# Patient Record
Sex: MALE | Race: WHITE | NOT HISPANIC OR LATINO | Employment: OTHER | URBAN - METROPOLITAN AREA
[De-identification: names, ages, dates, MRNs, and addresses within clinical notes are randomized per-mention and may not be internally consistent; named-entity substitution may affect disease eponyms.]

---

## 2017-02-24 DIAGNOSIS — E11.9 TYPE 2 DIABETES MELLITUS WITHOUT COMPLICATION: ICD-10-CM

## 2017-05-03 ENCOUNTER — OFFICE VISIT (OUTPATIENT)
Dept: PODIATRY | Facility: CLINIC | Age: 57
End: 2017-05-03
Payer: COMMERCIAL

## 2017-05-03 VITALS — HEIGHT: 71 IN | BODY MASS INDEX: 26.08 KG/M2 | WEIGHT: 186.31 LBS

## 2017-05-03 DIAGNOSIS — E11.42 DIABETIC POLYNEUROPATHY ASSOCIATED WITH TYPE 2 DIABETES MELLITUS: Primary | ICD-10-CM

## 2017-05-03 DIAGNOSIS — L84 CORN OR CALLUS: ICD-10-CM

## 2017-05-03 DIAGNOSIS — M21.6X9 ACQUIRED EQUINUS DEFORMITY OF FOOT, UNSPECIFIED LATERALITY: ICD-10-CM

## 2017-05-03 PROCEDURE — 1160F RVW MEDS BY RX/DR IN RCRD: CPT | Mod: S$GLB,,, | Performed by: PODIATRIST

## 2017-05-03 PROCEDURE — 3044F HG A1C LEVEL LT 7.0%: CPT | Mod: S$GLB,,, | Performed by: PODIATRIST

## 2017-05-03 PROCEDURE — 99213 OFFICE O/P EST LOW 20 MIN: CPT | Mod: S$GLB,,, | Performed by: PODIATRIST

## 2017-05-03 PROCEDURE — 17999 UNLISTD PX SKN MUC MEMB SUBQ: CPT | Mod: CSM,,, | Performed by: PODIATRIST

## 2017-05-03 PROCEDURE — 99999 PR PBB SHADOW E&M-EST. PATIENT-LVL II: CPT | Mod: PBBFAC,,, | Performed by: PODIATRIST

## 2017-05-03 RX ORDER — UREA 40 %
CREAM (GRAM) TOPICAL DAILY
Qty: 185 G | Refills: 11 | Status: SHIPPED | OUTPATIENT
Start: 2017-05-03 | End: 2017-10-12

## 2017-05-03 NOTE — MR AVS SNAPSHOT
[facility]  [address] [place] [address]  Phone: [phone]                  Austin Smith   5/3/2017 3:30 PM   Office Visit    Description:  Male : 1960   Provider:  Eddie Rodney DPM   Department:  [facility]           Reason for Visit     Foot Pain           Diagnoses this Visit        Comments    Diabetic polyneuropathy associated with type 2 diabetes mellitus    -  Primary     Corn or callus         Acquired equinus deformity of foot, unspecified laterality                To Do List           Goals (5 Years of Data)     None      Follow-Up and Disposition     Return in about 1 year (around 5/3/2018) for AR exam.       These Medications        Disp Refills Start End    urea (CARMOL) 40 % Crea 185 g 11 5/3/2017     Apply topically once daily. - Topical    Pharmacy: [facility] - [place] - [address] [facility] Ph #: [phone]         [facility] On [facility]     [facility] On Call Nurse Care Line -  Assistance  Unless otherwise directed by your provider, please contact [facility], our nurse care line that is available for  assistance.     Registered nurses in the [facility] provide: appointment scheduling, clinical advisement, health education, and other advisory services.  Call: [phone] (toll free)               Medications           Message regarding Medications     Verify the changes and/or additions to your medication regime listed below are the same as discussed with your clinician today.  If any of these changes or additions are incorrect, please notify your healthcare provider.        START taking these NEW medications        Refills    urea (CARMOL) 40 % Crea 11    Sig: Apply topically once daily.    Class: Normal    Route: Topical      STOP taking these medications     gabapentin (NEURONTIN) 300 MG capsule Take 1 capsule (300 mg total) by mouth 3 (three) times daily.    hydrocodone-acetaminophen 5-325mg (NORCO) 5-325 mg  "per tablet Take 1 tablet by mouth every 6 (six) hours as needed for Pain.           Verify that the below list of medications is an accurate representation of the medications you are currently taking.  If none reported, the list may be blank. If incorrect, please contact your healthcare provider. Carry this list with you in case of emergency.           Current Medications     blood sugar diagnostic (ONE TOUCH ULTRA TEST) Strp Test twice a day    aspirin 81 mg Tab once daily. Every day    ONE TOUCH ULTRASOFT LANCETS MISC Test twice a day    urea (CARMOL) 40 % Crea Apply topically once daily.           Clinical Reference Information           Your Vitals Were     Height Weight BMI          5' 11" (1.803 m) 84.5 kg (186 lb 4.6 oz) 25.98 kg/m2        Allergies as of 5/3/2017     No Known Allergies      Immunizations Administered on Date of Encounter - 5/3/2017     None      Orders Placed During Today's Visit      Normal Orders This Visit    DIABETIC SHOES FOR HOME USE       Smoking Cessation     If you would like to quit smoking:   You may be eligible for free services if you are a [place] resident and started smoking cigarettes before September 1, 1988.  Call the Smoking Cessation Trust ([facility]) toll free at [phone] [phone] or [phone].   Call 1-800-QUIT-NOW if you do not meet the above criteria.   Contact us via email: [email or web address]   View our website for more information: [email or web address]        Language Assistance Services     ATTENTION: Language assistance services are available, free of charge. Please call [phone].      ATENCIÓN: Si habla español, tiene a loaiza disposición servicios gratuitos de asistencia lingüística. Llame al [phone].     CHÚ Ý: N?u b?n nói Ti?ng Vi?t, có các d?ch v? h? tr? ngôn ng? mi?n phí dành cho b?n. G?i s? [phone].         [facility] complies with applicable Federal civil rights laws and does not discriminate on the basis of race, " color, national origin, age, disability, or sex.

## 2017-05-03 NOTE — PROGRESS NOTES
Subjective:      Patient ID: Austin Smith is a 57 y.o. male.    Chief Complaint: Foot Pain (bilateral)    Austin is a 57 y.o. male who presents to the clinic upon referral from Dr. Clara martinez. provider found  for evaluation and treatment of diabetic feet. Austin has a past medical history of Anticoagulant long-term use; Arthritis; Cataract; DM type 2 (diabetes mellitus, type 2); GERD (gastroesophageal reflux disease); Hyperlipidemia; Hypertension; Iris nevus; Lattice degeneration; Migraine syndrome; Neck pain; and Wears glasses. Patient relates no major problem with feet. Only complaints today consist of thick painful callus bilateral feet.  Gradual onset, worsening over past several weeks, aggravated by increased weight bearing, shoe gear, pressure.  Periodic debridement helps symptoms.      PCP: Alessandro Deleon MD    Date Last Seen by PCP:   Chief Complaint   Patient presents with    Foot Pain     bilateral        Current shoe gear: Casual shoes    Hemoglobin A1C   Date Value Ref Range Status   07/14/2016 6.5 (H) 4.5 - 6.2 % Final     Comment:     According to ADA guidelines, hemoglobin A1C <7.0% represents  optimal control in non-pregnant diabetic patients.  Different  metrics may apply to specific populations.   Standards of Medical Care in Diabetes - 2016.  For the purpose of screening for the presence of diabetes:  <5.7%     Consistent with the absence of diabetes  5.7-6.4%  Consistent with increasing risk for diabetes   (prediabetes)  >or=6.5%  Consistent with diabetes  Currently no consensus exists for use of hemoglobin A1C  for diagnosis of diabetes for children.     07/15/2014 6.6 (H) 4.5 - 6.2 % Final   09/10/2012 7.4 (H) 4.0 - 6.2 % Final           Review of Systems   Constitution: Negative for chills, diaphoresis, fever, malaise/fatigue and night sweats.   Cardiovascular: Negative for claudication, cyanosis, leg swelling and syncope.   Skin: Positive for suspicious lesions. Negative for color  change, dry skin, nail changes, rash and unusual hair distribution.   Musculoskeletal: Negative for falls, joint pain, joint swelling, muscle cramps, muscle weakness and stiffness.   Gastrointestinal: Negative for constipation, diarrhea, nausea and vomiting.   Neurological: Positive for numbness and sensory change. Negative for brief paralysis, disturbances in coordination, focal weakness, paresthesias and tremors.           Objective:      Physical Exam   Constitutional: He appears well-developed and well-nourished. He is cooperative. No distress.   Cardiovascular:   Pulses:       Popliteal pulses are 2+ on the right side, and 2+ on the left side.        Dorsalis pedis pulses are 2+ on the right side, and 2+ on the left side.        Posterior tibial pulses are 2+ on the right side, and 2+ on the left side.   Capillary refill 3 seconds all toes/distal feet, all toes/both feet warm to touch.      Negative lymphadenopathy bilateral popliteal fossa and tarsal tunnel.      Negavie lower extremity edema bilateral.     Musculoskeletal:        Right ankle: Normal. He exhibits normal range of motion, no swelling, no ecchymosis, no deformity, no laceration and normal pulse. Achilles tendon normal. Achilles tendon exhibits no pain, no defect and normal Fletcher's test results.   Ankle dorsiflexion decreased at <10 degrees bilateral with moderate increase with knee flexion bilateral.    Otherwise, Normal angle, base, station of gait. All ten toes without clubbing, cyanosis, or signs of ischemia.  No pain to palpation bilateral lower extremities.  Range of motion, stability, muscle strength, and muscle tone normal bilateral feet and legs.     Lymphadenopathy: No inguinal adenopathy noted on the right or left side.   Negative lymphadenopathy bilateral popliteal fossa and tarsal tunnel.   Neurological: He is alert. He has normal strength. He displays no atrophy and no tremor. A sensory deficit is present. He exhibits normal  muscle tone. He displays no seizure activity. Gait normal.   Reflex Scores:       Patellar reflexes are 2+ on the right side and 2+ on the left side.       Achilles reflexes are 2+ on the right side and 2+ on the left side.  Diminished/loss of protective sensation all toes bilateral to 10 gram monofilament.     Skin: Skin is warm, dry and intact. No abrasion, no bruising, no burn, no ecchymosis, no laceration, no lesion and no rash noted. He is not diaphoretic. No cyanosis or erythema. No pallor. Nails show no clubbing.     Focal hyperkeratotic lesion consisting entirely of hyperkeratotic tissue without open skin, drainage, pus, fluctuance, malodor, or signs of infection beneath mtpj 5 left, medial heel left, mtpj 2 right, distal 4th toe left.    Otherwise, Skin is normal age and health appropriate color, turgor, texture, and temperature bilateral lower extremities without ulceration, hyperpigmentation, discoloration, masses nodules or cords palpated.  No ecchymosis, erythema, edema, or cardinal signs of infection bilateral lower extremities.    Toenails normal color and trophic qualities.              Assessment:       Encounter Diagnoses   Name Primary?    Diabetic polyneuropathy associated with type 2 diabetes mellitus Yes    Corn or callus     Acquired equinus deformity of foot, unspecified laterality          Plan:       Austin was seen today for foot pain.    Diagnoses and all orders for this visit:    Diabetic polyneuropathy associated with type 2 diabetes mellitus  -     DIABETIC SHOES FOR HOME USE    Corn or callus  -     DIABETIC SHOES FOR HOME USE    Acquired equinus deformity of foot, unspecified laterality  -     DIABETIC SHOES FOR HOME USE    Other orders  -     urea (CARMOL) 40 % Crea; Apply topically once daily.      I counseled the patient on his conditions, their implications and medical management.        - Shoe inspection. Diabetic Foot Education. Patient reminded of the importance of good  nutrition and blood sugar control to help prevent podiatric complications of diabetes. Patient instructed on proper foot hygeine. We discussed wearing proper shoe gear, daily foot inspections, never walking without protective shoe gear, never putting sharp instruments to feet, routine podiatric visits at least annually.      Rx urea, DM shoes, custom inserts.    Non covered foot care:    With the patient's permission, I debrided hyperkeratotic lesion(s) as above totaling         4       to, not  Including dermis with sterile #15 blade.  Patient tolerated the procedure well and related significant relief.              No Follow-up on file.

## 2017-06-13 ENCOUNTER — TELEPHONE (OUTPATIENT)
Dept: PODIATRY | Facility: CLINIC | Age: 57
End: 2017-06-13

## 2017-06-13 NOTE — TELEPHONE ENCOUNTER
Clinical note and diabetic shoe prescription faxed to [facility] via [facility].

## 2017-07-12 ENCOUNTER — OFFICE VISIT (OUTPATIENT)
Dept: PODIATRY | Facility: CLINIC | Age: 57
End: 2017-07-12
Payer: COMMERCIAL

## 2017-07-12 VITALS — WEIGHT: 186.31 LBS | HEIGHT: 71 IN | BODY MASS INDEX: 26.08 KG/M2

## 2017-07-12 DIAGNOSIS — M21.6X9 EQUINUS DEFORMITY OF FOOT: ICD-10-CM

## 2017-07-12 DIAGNOSIS — M21.6X9 ACQUIRED EQUINUS DEFORMITY OF FOOT, UNSPECIFIED LATERALITY: ICD-10-CM

## 2017-07-12 DIAGNOSIS — E11.42 DIABETIC POLYNEUROPATHY ASSOCIATED WITH TYPE 2 DIABETES MELLITUS: Primary | ICD-10-CM

## 2017-07-12 DIAGNOSIS — M20.42 HAMMER TOES OF BOTH FEET: ICD-10-CM

## 2017-07-12 DIAGNOSIS — M20.41 HAMMER TOES OF BOTH FEET: ICD-10-CM

## 2017-07-12 DIAGNOSIS — B35.1 ONYCHOMYCOSIS DUE TO DERMATOPHYTE: ICD-10-CM

## 2017-07-12 DIAGNOSIS — L84 CORN OR CALLUS: ICD-10-CM

## 2017-07-12 PROCEDURE — 99999 PR PBB SHADOW E&M-EST. PATIENT-LVL II: CPT | Mod: PBBFAC,,,

## 2017-07-12 PROCEDURE — 3044F HG A1C LEVEL LT 7.0%: CPT | Mod: S$GLB,,,

## 2017-07-12 PROCEDURE — 11721 DEBRIDE NAIL 6 OR MORE: CPT | Mod: 59,Q9,S$GLB,

## 2017-07-12 PROCEDURE — 11056 PARNG/CUTG B9 HYPRKR LES 2-4: CPT | Mod: Q9,S$GLB,,

## 2017-07-12 PROCEDURE — 99214 OFFICE O/P EST MOD 30 MIN: CPT | Mod: 25,S$GLB,,

## 2017-07-12 RX ORDER — GABAPENTIN 300 MG/1
300 CAPSULE ORAL NIGHTLY
Qty: 30 CAPSULE | Refills: 6 | Status: SHIPPED | OUTPATIENT
Start: 2017-07-12 | End: 2017-11-08

## 2017-07-12 NOTE — PROGRESS NOTES
Subjective:       Patient ID: Austin Smith is a 57 y.o. male.    Chief Complaint: Foot Pain (chris foot pain , pt would like second opinion , Saw Dr. Rodney in May of this year)    BEATRIS Avila is a 57 y.o. male who presents to the clinic for evaluation and treatment of high risk feet. Austin has a past medical history of Anticoagulant long-term use; Arthritis; Cataract; DM type 2 (diabetes mellitus, type 2); GERD (gastroesophageal reflux disease); Hyperlipidemia; Hypertension; Iris nevus; Lattice degeneration; Migraine syndrome; Neck pain; and Wears glasses. The patient's chief complaint is long, thick toenails, painful calluses, burning to feet. This patient has documented high risk feet requiring routine maintenance secondary to peripheral neuropathy.    PCP: Alessandro Deleon MD      Current shoe gear:  Affected Foot: Tennis shoes     Unaffected Foot: Tennis shoes    Hemoglobin A1C   Date Value Ref Range Status   07/14/2016 6.5 (H) 4.5 - 6.2 % Final     Comment:     According to ADA guidelines, hemoglobin A1C <7.0% represents  optimal control in non-pregnant diabetic patients.  Different  metrics may apply to specific populations.   Standards of Medical Care in Diabetes - 2016.  For the purpose of screening for the presence of diabetes:  <5.7%     Consistent with the absence of diabetes  5.7-6.4%  Consistent with increasing risk for diabetes   (prediabetes)  >or=6.5%  Consistent with diabetes  Currently no consensus exists for use of hemoglobin A1C  for diagnosis of diabetes for children.     07/15/2014 6.6 (H) 4.5 - 6.2 % Final   09/10/2012 7.4 (H) 4.0 - 6.2 % Final     Review of Systems  ROS:  Constitution: Negative for chills, fever, weakness and malaise/fatigue.   HEENT: Negative for headaches.   Cardiovascular: Negative for chest pain and claudication.   Respiratory: Negative for cough and shortness of breath.   Musculoskeletal: Positive for foot pain.  Negative for muscle cramps and muscle weakness.    Gastrointestinal: Negative for nausea and vomiting.   Neurological: Positive for numbness and paresthesias.   Dermatological: Negative for wound.        Objective:      Physical Exam  Constitutional:   Patient is oriented to person, place, and time. Vital signs are normal. Appears well-developed and well-nourished.     Vascular:   Dorsalis pedis pulses are 2+ on the right side, and 2+ on the left side.   Posterior tibial pulses are 2+ on the right side, and 2+ on the left side.   - digital hair growth, capillary fill time to all toes <3 seconds, toes are cool to touch  - swelling feet and ankles    Skin/Dermatological:   Skin is thin, warm, shiny and atrophic. No cyanosis or clubbing. No rashes noted. No open wounds.   All ten toenails yellow discolored, thickened 3 mm, elongated 3 mm with subungual debris and tenderness.  Porokeratosis sub 2nd MTH, left sub 5th MTh    Musculoskeletal:   Pes cavus, mild hammertoes and bunionettes observed.  Decreased range of motion of bilateral midtarsal, subtalar joints, ankle joint dorsiflexion is restricted bilaterally. Muscle strength to tibialis anterior, extensor hallucis longus, extensor digitorum longus, peroneal muscles, flexor hallucis/digotorum longus, posterior tibial and gastrosoleal complex is 5/5.    Neurological:   Positive deficits to sharp/dull, light touch or vibratory sensation bilateral feet             Assessment:       1. Diabetic polyneuropathy associated with type 2 diabetes mellitus    2. Corn or callus    3. Acquired equinus deformity of foot, unspecified laterality    4. Hammer toes of both feet    5. Onychomycosis due to dermatophyte        Plan:       Diabetic polyneuropathy associated with type 2 diabetes mellitus  -     gabapentin (NEURONTIN) 300 MG capsule; Take 1 capsule (300 mg total) by mouth every evening.  Dispense: 30 capsule; Refill: 6  -     DIABETIC SHOES FOR HOME USE    Corn or callus  -     DIABETIC SHOES FOR HOME USE    Acquired  equinus deformity of foot, unspecified laterality  -     DIABETIC SHOES FOR HOME USE    Hammer toes of both feet  -     DIABETIC SHOES FOR HOME USE    Onychomycosis due to dermatophyte  -     DIABETIC SHOES FOR HOME USE          Shoe inspection. Diabetic Foot Education. Patient reminded of the importance of good nutrition and blood sugar control to help prevent podiatric complications of diabetes. Patient instructed on proper foot hygeine. We discussed wearing proper shoe gear, daily foot inspections, never walking without protective shoe gear, never putting sharp instruments to feet.  We also discussed padding and shoes with high toe boxes for foot deformities.     - With patient's permission, all ten toenails were aggressively reduced and debrided  to their soft tissue attachment mechanically with nail nipper, removing all offending nail and debris. The porokeratotic tissue was pared x 3 with a 15 blade.  Patient relates relief following the procedure. Patient will continue to monitor the areas daily, inspect feet, wear protective shoe gear when ambulatory, moisturizer to maintain skin integrity and follow in this office in approximately 6 months, sooner p.r.n.

## 2017-07-21 DIAGNOSIS — E11.9 TYPE 2 DIABETES MELLITUS WITHOUT COMPLICATION: ICD-10-CM

## 2017-08-21 DIAGNOSIS — Z12.11 COLON CANCER SCREENING: ICD-10-CM

## 2017-10-12 ENCOUNTER — OFFICE VISIT (OUTPATIENT)
Dept: PODIATRY | Facility: CLINIC | Age: 57
End: 2017-10-12
Payer: COMMERCIAL

## 2017-10-12 VITALS — BODY MASS INDEX: 26.51 KG/M2 | WEIGHT: 189.38 LBS | HEIGHT: 71 IN

## 2017-10-12 DIAGNOSIS — E11.9 CONTROLLED TYPE 2 DIABETES MELLITUS WITHOUT COMPLICATION, WITHOUT LONG-TERM CURRENT USE OF INSULIN: Primary | ICD-10-CM

## 2017-10-12 DIAGNOSIS — L84 CORN OR CALLUS: ICD-10-CM

## 2017-10-12 DIAGNOSIS — L84 PRE-ULCERATIVE CALLUSES: ICD-10-CM

## 2017-10-12 PROCEDURE — 99999 PR PBB SHADOW E&M-EST. PATIENT-LVL III: CPT | Mod: PBBFAC,,, | Performed by: PODIATRIST

## 2017-10-12 PROCEDURE — 99213 OFFICE O/P EST LOW 20 MIN: CPT | Mod: S$GLB,,, | Performed by: PODIATRIST

## 2017-10-12 RX ORDER — UREA 40 %
CREAM (GRAM) TOPICAL 2 TIMES DAILY
Qty: 1 BOTTLE | Refills: 3 | Status: SHIPPED | OUTPATIENT
Start: 2017-10-12 | End: 2018-03-06

## 2017-10-12 NOTE — PROGRESS NOTES
Subjective:      Patient ID: Austin Smith is a 57 y.o. male.    Chief Complaint: Diabetic Foot Exam (PCP:  Dr Deleon  8/31/16  HgbA1c: 7/14/16 6.5); Callouses; and Nail Care    Austin is a 57 y.o. male who presents to the clinic upon referral from Dr. Mackey  for evaluation and treatment of diabetic feet. Austin has a past medical history of Anticoagulant long-term use; Arthritis; Cataract; DM type 2 (diabetes mellitus, type 2); GERD (gastroesophageal reflux disease); Hyperlipidemia; Hypertension; Iris nevus; Lattice degeneration; Migraine syndrome; Neck pain; and Wears glasses. Patient relates no major problem with feet. Only complaints today consist of pain secondary to calluses bilateral, pain is mostly present with weight bearing.     Current shoe gear: Tennis shoes    Hemoglobin A1C   Date Value Ref Range Status   07/14/2016 6.5 (H) 4.5 - 6.2 % Final     Comment:     According to ADA guidelines, hemoglobin A1C <7.0% represents  optimal control in non-pregnant diabetic patients.  Different  metrics may apply to specific populations.   Standards of Medical Care in Diabetes - 2016.  For the purpose of screening for the presence of diabetes:  <5.7%     Consistent with the absence of diabetes  5.7-6.4%  Consistent with increasing risk for diabetes   (prediabetes)  >or=6.5%  Consistent with diabetes  Currently no consensus exists for use of hemoglobin A1C  for diagnosis of diabetes for children.     07/15/2014 6.6 (H) 4.5 - 6.2 % Final   09/10/2012 7.4 (H) 4.0 - 6.2 % Final           Review of Systems   Constitution: Negative for chills and fever.   Cardiovascular: Negative for claudication and leg swelling.   Respiratory: Negative for shortness of breath.    Skin: Positive for suspicious lesions. Negative for itching, nail changes and rash.   Musculoskeletal: Negative for muscle cramps, muscle weakness and myalgias.   Gastrointestinal: Negative for nausea and vomiting.   Neurological: Negative for focal  weakness, loss of balance, numbness and paresthesias.           Objective:      Physical Exam   Constitutional: He is oriented to person, place, and time. He appears well-developed and well-nourished. No distress.   Cardiovascular:   Pulses:       Dorsalis pedis pulses are 2+ on the right side, and 2+ on the left side.        Posterior tibial pulses are 2+ on the right side, and 2+ on the left side.   < 3 sec capillary refill time to toes 1-5 bilateral. Toes and feet are warm to touch proximally with normal distal cooling b/l. There is some hair growth on the feet and toes b/l. There is no edema b/l. No spider veins or varicosities present b/l.      Musculoskeletal:   Equinus noted b/l ankles with < 10 deg DF noted. MMT 5/5 in DF/PF/Inv/Ev resistance with no reproduction of pain in any direction. Passive range of motion of ankle and pedal joints is painless b/l.     Feet:   Right Foot:   Protective Sensation: 10 sites tested. 10 sites sensed.   Left Foot:   Protective Sensation: 10 sites tested. 10 sites sensed.   Neurological: He is alert and oriented to person, place, and time. He has normal strength. He displays no atrophy and no tremor. No sensory deficit. He exhibits normal muscle tone.   Reflex Scores:       Achilles reflexes are 2+ on the right side and 2+ on the left side.  Negative tinel sign bilateral.   Skin: Skin is warm, dry and intact. Lesion noted. No abrasion, no bruising, no burn, no ecchymosis, no laceration, no petechiae and no rash noted. He is not diaphoretic. No cyanosis or erythema. No pallor. Nails show no clubbing.   Skin temperature, texture and turgor within normal limits.    Hyperkeratotic lesions noted Left: Plantar fifth metatarsal head and medial heel, Right: Plantar first and second metatarsals. Sub second and first metatarsal right foot there are pre ulcerative lesions noted with discoloration of skin.   Psychiatric: He has a normal mood and affect. His behavior is normal.              Assessment:       Encounter Diagnoses   Name Primary?    Controlled type 2 diabetes mellitus without complication, without long-term current use of insulin Yes    Corn or callus     Pre-ulcerative calluses          Plan:       Austin was seen today for diabetic foot exam, callouses and nail care.    Diagnoses and all orders for this visit:    Controlled type 2 diabetes mellitus without complication, without long-term current use of insulin    Corn or callus    Pre-ulcerative calluses    Other orders  -     urea (CARMOL) 40 % Crea; Apply topically 2 (two) times daily.      I counseled the patient on his conditions, their implications and medical management.    Use the urea cream BID    Patient will obtain over the counter arch supports and wear them in shoes whenever possible.  Athletic shoes intended for walking or running are usually best.    Fabricated, dispensed and fitted with metatarsal pad bilateral shoes.     Patient will stretch the tendo achilles complex three times daily as demonstrated in the office.  Literature was dispensed illustrating proper stretching technique.    Discussed diabetic shoes that were prescribed by Dr. Mackey at last appointment, he does not want the diabetic shoes.    Shoe inspection. Diabetic Foot Education. Patient reminded of the importance of good nutrition and blood sugar control to help prevent podiatric complications of diabetes. Patient instructed on proper foot hygeine. We discussed wearing proper shoe gear, daily foot inspections, never walking without protective shoe gear, never putting sharp instruments to feet    With patient's verbal consent the hyperkeratotic lesions x4 bilateral foot were trimmed to healthy appearing skin using a # 15 scalpel. Patient relates relief of pain following the procedure. Patient tolerated the procedure well without complications. No anesthesia or hemostasis required. No blood loss.    Discussed that he does not qualify for routine care  to be covered by insurance. Next time he comes for callus debridement he will need to be here for Procedure Brief, paperwork given explaining procedure Brief policy.    Return PRN    NINI SunM

## 2017-10-12 NOTE — PATIENT INSTRUCTIONS
1. Stretch calf and plantar fascia 10x per day for 30 sec and before getting out of bed.    2. Supportive shoes at all times (athletic shoe including doll, new balance, asics, HOKA or casual shoes like Dansko, Syeda, Naot, Vionoic, Fit flop  clog or wedge with extra heel padding and arch support.    (Varsity sports, Phidippides, [place] running company, Masseys, Goodfeet, Cantilever, Feet First, Foot Solutions, Therapeutic shoes, SAS, [facility] pro shop) [email or web address]    3. Orthotic (recommend the following brands: Superfeet, Spenco, Powerstep, Sof Sole Fit Series)    What is a Foot Callus?    Calluses are a thickening of the surface layer of the skin. This usually occurs in response to pressure. Calluses often form on the ball of the foot, the heel, and the underside of the big toe.    Calluses are more common in women than men.    How is a Foot Callus formed?    The formation of calluses is caused by an accumulation of dead skin cells that harden and thicken over an area of the foot.    Symptoms of a Foot Callus    The most common symptoms are:        A hard growth usually on the ball of the foot      Pain on weight bearing, relieved by rest      Increased discomfort in thin soled and high heeled shoes    What Causes a Foot Callus?    Calluses develop because of excessive pressure at a specific area of the foot.   Calluses and corns on the feet are often caused by pressure from footwear. Walking barefoot also causes calluses.    The most common causes of calluses are:        High heeled shoes      Mal-allignment of the metatarsal bones      Shoes that are too small      Abnormalities of gait      Flat feet      High arched feet      Excessively long metatarsal bone      Obesity      Bony prominence      Loss of fat pad on the underside of the foot      Short Achilles tendon       How are they treated?    Calluses and corns do not need treatment unless they cause pain. If they do  cause pain, you can ease the pain by:        Wearing shoes that fit well and are roomy, with wide and deep toe boxes (the area that surrounds the toes).          A wider toe box keeps the toes from pressing against each other, relieving pressure on soft corns.          A deeper toe box keeps the toes from pressing against the top of the shoe, relieving pressure on hard corns.        Using protective padding while your foot heals, such as:          Moleskin.          Toe separators          Toe crest pads          Toe caps and toe sleeves    Other things you can try include:        Reducing the size of the callus or corn by soaking your callus or corn in warm water and then using a pumice stone to lightly wear away the dead skin.         Using salicylic acid to soften the callus or corn. You can then rub the callus or corn off with a pumice stone. Some doctors advise against using salicylic acid because it can damage surrounding skin. If you use salicylic acid, be sure to apply it only to the callus or corn and not to the surrounding skin. And never use salicylic acid if you have diabetes or other conditions that cause circulatory problems or numbness.      Prevention    Corns and calluses are easier to prevent than to treat. When it is not desirable to form a callus, minimizing rubbing and pressure will prevent callus formation. Footwear should be properly fitted  and protective pads, rings or skin dressings may be used. People with poor circulation or sensation should check their skin often for signs of rubbing and irritation so they can minimize any damage.

## 2017-10-12 NOTE — LETTER
October 12, 2017      Alpesh Mackey DPM  [address] [facility] [address] [place] [address]           [facility]  [address] [facility] [address] [place] [address]  Phone: [phone]          Patient: Austin Smith   MR Number: [id number]   YOB: 1960   Date of Visit: 10/12/2017       Dear Dr. Alpesh Mackey:    Thank you for referring Austin Smith to me for evaluation. Attached you will find relevant portions of my assessment and plan of care.    If you have questions, please do not hesitate to call me. I look forward to following Austin Smith along with you.    Sincerely,    Austen Rios, TAB    Enclosure  CC:  No Recipients    If you would like to receive this communication electronically, please contact [email or web address] or [phone] to request more information on [employer] access.    For providers and/or their staff who would like to refer a patient to [facility], please contact us through our one-stop-shop provider referral line, [facility], at [phone].    If you feel you have received this communication in error or would no longer like to receive these types of communications, please e-mail [email or web address]

## 2017-11-08 ENCOUNTER — TELEPHONE (OUTPATIENT)
Dept: PAIN MEDICINE | Facility: CLINIC | Age: 57
End: 2017-11-08

## 2017-11-08 ENCOUNTER — OFFICE VISIT (OUTPATIENT)
Dept: PAIN MEDICINE | Facility: CLINIC | Age: 57
End: 2017-11-08
Payer: COMMERCIAL

## 2017-11-08 VITALS
SYSTOLIC BLOOD PRESSURE: 150 MMHG | DIASTOLIC BLOOD PRESSURE: 80 MMHG | WEIGHT: 189.38 LBS | BODY MASS INDEX: 26.41 KG/M2 | RESPIRATION RATE: 18 BRPM | HEART RATE: 78 BPM | TEMPERATURE: 99 F

## 2017-11-08 DIAGNOSIS — M50.30 DDD (DEGENERATIVE DISC DISEASE), CERVICAL: ICD-10-CM

## 2017-11-08 DIAGNOSIS — M54.12 CERVICAL RADICULOPATHY: Primary | ICD-10-CM

## 2017-11-08 PROCEDURE — 99999 PR PBB SHADOW E&M-EST. PATIENT-LVL III: CPT | Mod: PBBFAC,,, | Performed by: PHYSICIAN ASSISTANT

## 2017-11-08 PROCEDURE — 99213 OFFICE O/P EST LOW 20 MIN: CPT | Mod: S$GLB,,, | Performed by: PHYSICIAN ASSISTANT

## 2017-11-08 RX ORDER — SODIUM CHLORIDE, SODIUM LACTATE, POTASSIUM CHLORIDE, CALCIUM CHLORIDE 600; 310; 30; 20 MG/100ML; MG/100ML; MG/100ML; MG/100ML
INJECTION, SOLUTION INTRAVENOUS CONTINUOUS
Status: CANCELLED | OUTPATIENT
Start: 2017-12-04

## 2017-11-08 RX ORDER — GABAPENTIN 300 MG/1
300 CAPSULE ORAL 2 TIMES DAILY
Qty: 60 CAPSULE | Refills: 2 | Status: SHIPPED | OUTPATIENT
Start: 2017-11-08 | End: 2018-05-23

## 2017-11-08 NOTE — PROGRESS NOTES
This note was completed with dictation software and grammatical errors may exist.    CC:Neck pain    HPI: The patient is a 57-year-old man with a history of diabetes, Alcohol abuse who presents in referral from Dr. Deleon for neck pain.  He returns in follow-up today with neck pain.  This has been slowly getting worse.  It is located in the bilateral neck, trapezius muscles, upper back and also causing headaches.  He describes it as popping, cracking, aching, worse at night and with lifting anything.  He states he has to constantly change positions to find some sort of relief.  He reports occasional numbness in his trapezius muscles and shoulders.  He denies weakness, bladder or bowel incontinence.    Pain intervention history:  He is status post C7-T1 cervical interlaminar epidural steroid injection on 7/22/14 with 25-30% relief.  He is status post C7-T1 cervical interlaminar epidural steroid injection on 8/27/14 with no additional relief.     ROS:He reports headaches, joint stiffness, joint swelling and difficulty sleeping.  Balance of review of systems is negative.    Patient has a prior history of alcohol abuse, was previously mandated to attend AA, was previously taking Antabuse.  Has a history of 4 DUIs.    Medical, surgical, family and social history reviewed elsewhere in record.    Medications/Allergies: See med card    Vitals:    11/08/17 1024   BP: (!) 150/80   Pulse: 78   Resp: 18   Temp: 98.7 °F (37.1 °C)   TempSrc: Oral   Weight: 85.9 kg (189 lb 6 oz)   PainSc:   6   PainLoc: Neck         Physical exam:  Gen: A and O x3, pleasant, well-groomed  Skin: No rashes or obvious lesions  HEENT: PERRLA  CVS: Regular rate and rhythm, normal S1 and S2, no murmurs.  Resp: Clear to auscultation bilaterally, no wheezes or rales.  Abdomen: Soft, NT/ND, normal bowel sounds present.  Musculoskeletal: Able to heel walk, toe walk. No antalgic gait.     Neuro:  Upper extremities: 5/5 strength bilaterally   Lower  extremities: 5/5 strength bilaterally  Reflexes: Brachioradialis 2+, Bicep 2+, Tricep 2+. Patellar 2+, Achilles 2+.  Sensory: Intact and symmetrical to light touch and pinprick in C2-T1 dermatomes bilaterally. Intact and symmetrical to light touch and pinprick in L2-S1 dermatomes bilaterally.    Cervical Spine:  Cervical spine:Range of motion is moderately decreased with flexion, extension and lateral rotation with increased midline neck pain during each maneuver.  Spurling's maneuver causes neck pain to either side, no radicular pain.  Myofascial exam: mild tenderness to palpation across bilateral cervical paraspinous muscles.      Imagin14 MRI C-spine  1. C3-4 mild bilateral foraminal stenosis.  2. C4-5 moderate bilateral foraminal stenosis, left greater than right.  3. C5-6 disc-osteophyte complex with spinal cord impingement and severe bilateral foraminal stenosis.  4. C6-7 mild disc bulge with moderate left and mild right foraminal stenosis.      Assessment:  The patient is a 57-year-old man with a history of diabetes, Alcohol abuse who presents in referral from Dr. Deleon for neck pain.   1. Cervical radiculopathy     2. DDD (degenerative disc disease), cervical  Ambulatory referral to Neurosurgery    MRI Cervical Spine Without Contrast       Plan:  1.  The patient states that his pain is the worst it has ever been.  I will update his cervical spine MRI and have him see neurosurgery because of history of spinal cord impingement.  In the meantime, I will schedule him for a cervical DEVAN to see if this provides some relief.  I have also provided prescription for gabapentin 300 mg nightly, increasing to twice a day if tolerated.  2.  Follow-up in 4 weeks post procedure or sooner as needed.

## 2017-11-09 ENCOUNTER — HOSPITAL ENCOUNTER (OUTPATIENT)
Dept: RADIOLOGY | Facility: HOSPITAL | Age: 57
Discharge: HOME | End: 2017-11-09
Attending: PHYSICIAN ASSISTANT
Payer: COMMERCIAL

## 2017-11-09 DIAGNOSIS — M50.30 DDD (DEGENERATIVE DISC DISEASE), CERVICAL: ICD-10-CM

## 2017-11-09 PROCEDURE — 72141 MRI NECK SPINE W/O DYE: CPT | Mod: TC,PO

## 2017-11-09 PROCEDURE — 72141 MRI NECK SPINE W/O DYE: CPT | Mod: 26,,, | Performed by: RADIOLOGY

## 2017-11-14 ENCOUNTER — TELEPHONE (OUTPATIENT)
Dept: NEUROSURGERY | Facility: CLINIC | Age: 57
End: 2017-11-14

## 2017-11-14 NOTE — TELEPHONE ENCOUNTER
----- Message from Jennifer Huggins sent at 11/13/2017 11:33 AM CST -----  Contact: patient  Patient had to cancel his 11/13/17 appointment contact him at [phone] to reschedule.     Thank you

## 2017-11-28 DIAGNOSIS — M50.30 DDD (DEGENERATIVE DISC DISEASE), CERVICAL: Primary | ICD-10-CM

## 2017-12-01 ENCOUNTER — TELEPHONE (OUTPATIENT)
Dept: SURGERY | Facility: HOSPITAL | Age: 57
End: 2017-12-01

## 2017-12-01 NOTE — TELEPHONE ENCOUNTER
Patient stated he would like to cancel his procedure and see a neurologist first. He was instructed to call the office when ready to reschedule.

## 2017-12-06 ENCOUNTER — TELEPHONE (OUTPATIENT)
Dept: NEUROSURGERY | Facility: CLINIC | Age: 57
End: 2017-12-06

## 2017-12-06 NOTE — TELEPHONE ENCOUNTER
Left message for pt to return call to clinic to reschedule canceled appointment. Number provided.

## 2018-03-06 ENCOUNTER — OFFICE VISIT (OUTPATIENT)
Dept: PODIATRY | Facility: CLINIC | Age: 58
End: 2018-03-06
Payer: COMMERCIAL

## 2018-03-06 VITALS — WEIGHT: 187.19 LBS | HEIGHT: 71 IN | BODY MASS INDEX: 26.21 KG/M2

## 2018-03-06 DIAGNOSIS — B07.0 PLANTAR WART OF RIGHT FOOT: ICD-10-CM

## 2018-03-06 DIAGNOSIS — E11.9 CONTROLLED TYPE 2 DIABETES MELLITUS WITHOUT COMPLICATION, WITHOUT LONG-TERM CURRENT USE OF INSULIN: Primary | ICD-10-CM

## 2018-03-06 DIAGNOSIS — L84 CORN OR CALLUS: ICD-10-CM

## 2018-03-06 PROCEDURE — 99499 UNLISTED E&M SERVICE: CPT | Mod: S$GLB,,, | Performed by: PODIATRIST

## 2018-03-06 PROCEDURE — 17110 DESTRUCTION B9 LES UP TO 14: CPT | Mod: RT,S$GLB,, | Performed by: PODIATRIST

## 2018-03-06 PROCEDURE — 99999 PR PBB SHADOW E&M-EST. PATIENT-LVL III: CPT | Mod: PBBFAC,,, | Performed by: PODIATRIST

## 2018-03-06 NOTE — PROGRESS NOTES
Subjective:      Patient ID: Austin Smith is a 57 y.o. male.    Chief Complaint: Diabetes Mellitus (PCP:  Dr Deleon 8/31/16;  HgbA1c: 7/14/16  6.5); Callouses (right foot worse than left); and Follow-up    Austin is a 57 y.o. male who presents to the clinic upon referral from Dr. Clara martinez. provider found  for evaluation and treatment of diabetic feet. Austin has a past medical history of Anticoagulant long-term use; Arthritis; Cataract; DM type 2 (diabetes mellitus, type 2); GERD (gastroesophageal reflux disease); Hyperlipidemia; Hypertension; Iris nevus; Lattice degeneration; Migraine syndrome; Neck pain; and Wears glasses. Patient relates no major problem with feet. Only complaints today consist of pain secondary to calluses bilateral, pain is mostly present with weight bearing.     3/6/18: Patient returns with bilateral foot pain as the calluses have returned, most pain comes from the right lesions. Would like to try and treat them with chemical ablation as discussed last appointment.     Current shoe gear: Tennis shoes    Hemoglobin A1C   Date Value Ref Range Status   07/14/2016 6.5 (H) 4.5 - 6.2 % Final     Comment:     According to ADA guidelines, hemoglobin A1C <7.0% represents  optimal control in non-pregnant diabetic patients.  Different  metrics may apply to specific populations.   Standards of Medical Care in Diabetes - 2016.  For the purpose of screening for the presence of diabetes:  <5.7%     Consistent with the absence of diabetes  5.7-6.4%  Consistent with increasing risk for diabetes   (prediabetes)  >or=6.5%  Consistent with diabetes  Currently no consensus exists for use of hemoglobin A1C  for diagnosis of diabetes for children.     07/15/2014 6.6 (H) 4.5 - 6.2 % Final   09/10/2012 7.4 (H) 4.0 - 6.2 % Final           Review of Systems   Constitution: Negative for chills and fever.   Cardiovascular: Negative for claudication and leg swelling.   Respiratory: Negative for shortness of breath.     Skin: Positive for suspicious lesions. Negative for itching, nail changes and rash.   Musculoskeletal: Negative for muscle cramps, muscle weakness and myalgias.   Gastrointestinal: Negative for nausea and vomiting.   Neurological: Negative for focal weakness, loss of balance, numbness and paresthesias.           Objective:      Physical Exam   Constitutional: He is oriented to person, place, and time. He appears well-developed and well-nourished. No distress.   Cardiovascular:   Pulses:       Dorsalis pedis pulses are 2+ on the right side, and 2+ on the left side.        Posterior tibial pulses are 2+ on the right side, and 2+ on the left side.   < 3 sec capillary refill time to toes 1-5 bilateral. Toes and feet are warm to touch proximally with normal distal cooling b/l. There is some hair growth on the feet and toes b/l. There is no edema b/l. No spider veins or varicosities present b/l.      Musculoskeletal:   Equinus noted b/l ankles with < 10 deg DF noted. MMT 5/5 in DF/PF/Inv/Ev resistance with no reproduction of pain in any direction. Passive range of motion of ankle and pedal joints is painless b/l.     Feet:   Right Foot:   Protective Sensation: 10 sites tested. 10 sites sensed.   Left Foot:   Protective Sensation: 10 sites tested. 10 sites sensed.   Neurological: He is alert and oriented to person, place, and time. He has normal strength. He displays no atrophy and no tremor. No sensory deficit. He exhibits normal muscle tone.   Reflex Scores:       Achilles reflexes are 2+ on the right side and 2+ on the left side.  Negative tinel sign bilateral.   Skin: Skin is warm, dry and intact. Lesion noted. No abrasion, no bruising, no burn, no ecchymosis, no laceration, no petechiae and no rash noted. He is not diaphoretic. No cyanosis or erythema. No pallor. Nails show no clubbing.   Skin temperature, texture and turgor within normal limits.    Hyperkeratotic lesions noted Left: Plantar fifth metatarsal head  and medial heel, Right: Plantar first and second metatarsals. Sub second and first metatarsal right foot there are breaks in the skin lines and pain with side to side pressure more consistent with a plantars wart.   Psychiatric: He has a normal mood and affect. His behavior is normal.             Assessment:       Encounter Diagnoses   Name Primary?    Controlled type 2 diabetes mellitus without complication, without long-term current use of insulin Yes    Corn or callus     Plantar wart of right foot          Plan:       Austin was seen today for diabetes mellitus, callouses and follow-up.    Diagnoses and all orders for this visit:    Controlled type 2 diabetes mellitus without complication, without long-term current use of insulin    Corn or callus    Plantar wart of right foot      I counseled the patient on his conditions, their implications and medical management.    Use the urea cream BID    Patient will obtain over the counter arch supports and wear them in shoes whenever possible.  Athletic shoes intended for walking or running are usually best.    Patient will stretch the tendo achilles complex three times daily as demonstrated in the office.  Literature was dispensed illustrating proper stretching technique.    Discussed diabetic shoes, he does not want the diabetic shoes.    Shoe inspection. Diabetic Foot Education. Patient reminded of the importance of good nutrition and blood sugar control to help prevent podiatric complications of diabetes. Patient instructed on proper foot hygeine. We discussed wearing proper shoe gear, daily foot inspections, never walking without protective shoe gear, never putting sharp instruments to feet    With patient's verbal consent the hyperkeratotic lesions x2 left foot foot were trimmed to healthy appearing skin using a # 15 scalpel. Patient relates relief of pain following the procedure. Patient tolerated the procedure well without complications. No anesthesia or  hemostasis required. No blood loss.    Lesions x2 right foot locations as noted in objective portion of note above were sharply debrided with a 15 blade and curette to partial thickness dermis level.  Ablation was achieved with trichloracetic acid and salinocaine was applied to the lesions along with the aperture pads and moleskin. Patient was encouraged to keep the area clean and dry and stay off that area such as possible.  Tylenol p.r.n. for pain.  Discussed that it can take 2-3 applications, this is stage 1    Return 1 week Post op    Austen Rios DPM

## 2018-03-13 ENCOUNTER — OFFICE VISIT (OUTPATIENT)
Dept: PODIATRY | Facility: CLINIC | Age: 58
End: 2018-03-13
Payer: COMMERCIAL

## 2018-03-13 VITALS — WEIGHT: 187.19 LBS | BODY MASS INDEX: 26.21 KG/M2 | HEIGHT: 71 IN

## 2018-03-13 DIAGNOSIS — B07.0 PLANTAR WART OF RIGHT FOOT: Primary | ICD-10-CM

## 2018-03-13 PROCEDURE — 99999 PR PBB SHADOW E&M-EST. PATIENT-LVL III: CPT | Mod: PBBFAC,,, | Performed by: PODIATRIST

## 2018-03-13 PROCEDURE — 99024 POSTOP FOLLOW-UP VISIT: CPT | Mod: S$GLB,,, | Performed by: PODIATRIST

## 2018-03-15 NOTE — PROGRESS NOTES
Subjective:      Patient ID: Austin Smith is a 57 y.o. male.    Chief Complaint: Post-op Evaluation (1 wk post wart) and Diabetes Mellitus (PCP:  Dr Deleon: 8/31/16; HgbA1c: 7/14/16  6.5)    Austin is a 57 y.o. male who presents to the clinic upon referral from Dr. Elizabeth ref. provider found  for evaluation and treatment of diabetic feet. Austin has a past medical history of Anticoagulant long-term use; Arthritis; Cataract; DM type 2 (diabetes mellitus, type 2); GERD (gastroesophageal reflux disease); Hyperlipidemia; Hypertension; Iris nevus; Lattice degeneration; Migraine syndrome; Neck pain; and Wears glasses. Patient relates no major problem with feet. Only complaints today consist of pain secondary to calluses bilateral, pain is mostly present with weight bearing.     3/6/18: Patient returns with bilateral foot pain as the calluses have returned, most pain comes from the right lesions. Would like to try and treat them with chemical ablation as discussed last appointment.     3/13/18: Patient returns for follow up chemical ablation of right foot lesions. Feeling much better today, left the dressing on for 2 days, no other complaints at this time.     Current shoe gear: Tennis shoes    Hemoglobin A1C   Date Value Ref Range Status   07/14/2016 6.5 (H) 4.5 - 6.2 % Final     Comment:     According to ADA guidelines, hemoglobin A1C <7.0% represents  optimal control in non-pregnant diabetic patients.  Different  metrics may apply to specific populations.   Standards of Medical Care in Diabetes - 2016.  For the purpose of screening for the presence of diabetes:  <5.7%     Consistent with the absence of diabetes  5.7-6.4%  Consistent with increasing risk for diabetes   (prediabetes)  >or=6.5%  Consistent with diabetes  Currently no consensus exists for use of hemoglobin A1C  for diagnosis of diabetes for children.     07/15/2014 6.6 (H) 4.5 - 6.2 % Final   09/10/2012 7.4 (H) 4.0 - 6.2 % Final           Review of  Systems   Constitution: Negative for chills and fever.   Cardiovascular: Negative for claudication and leg swelling.   Respiratory: Negative for shortness of breath.    Skin: Positive for suspicious lesions. Negative for itching, nail changes and rash.   Musculoskeletal: Negative for muscle cramps, muscle weakness and myalgias.   Gastrointestinal: Negative for nausea and vomiting.   Neurological: Negative for focal weakness, loss of balance, numbness and paresthesias.           Objective:      Physical Exam   Constitutional: He is oriented to person, place, and time. He appears well-developed and well-nourished. No distress.   Cardiovascular:   Pulses:       Dorsalis pedis pulses are 2+ on the right side, and 2+ on the left side.        Posterior tibial pulses are 2+ on the right side, and 2+ on the left side.   < 3 sec capillary refill time to toes 1-5 bilateral. Toes and feet are warm to touch proximally with normal distal cooling b/l. There is some hair growth on the feet and toes b/l. There is no edema b/l. No spider veins or varicosities present b/l.      Musculoskeletal:   Equinus noted b/l ankles with < 10 deg DF noted. MMT 5/5 in DF/PF/Inv/Ev resistance with no reproduction of pain in any direction. Passive range of motion of ankle and pedal joints is painless b/l.     Feet:   Right Foot:   Protective Sensation: 10 sites tested. 10 sites sensed.   Left Foot:   Protective Sensation: 10 sites tested. 10 sites sensed.   Neurological: He is alert and oriented to person, place, and time. He has normal strength. He displays no atrophy and no tremor. No sensory deficit. He exhibits normal muscle tone.   Negative tinel sign bilateral.   Skin: Skin is warm, dry and intact. No abrasion, no bruising, no burn, no ecchymosis, no laceration, no lesion, no petechiae and no rash noted. He is not diaphoretic. No cyanosis or erythema. No pallor. Nails show no clubbing.   Skin temperature, texture and turgor within normal  limits.    Hyperkeratotic lesions Sub second and first metatarsal right foot after trimming hyperkeratotic lesion the skin lines appear intact and the skin appears normal in consistency.   Psychiatric: He has a normal mood and affect. His behavior is normal.             Assessment:       Encounter Diagnosis   Name Primary?    Plantar wart of right foot Yes   s/p chemical ablation 1 week      Plan:       Austin was seen today for post-op evaluation and diabetes mellitus.    Diagnoses and all orders for this visit:    Plantar wart of right foot      I counseled the patient on his conditions, their implications and medical management.    Use the urea cream BID    Patient will obtain over the counter arch supports and wear them in shoes whenever possible.  Athletic shoes intended for walking or running are usually best.    Patient will stretch the tendo achilles complex three times daily as demonstrated in the office.  Literature was dispensed illustrating proper stretching technique.    Discussed diabetic shoes, he does not want the diabetic shoes.    Lesions resolved for now, related there is a large possibility they can come back    Return 1 year diabetic check up or sooner MARY Rios DPM

## 2018-04-30 ENCOUNTER — TELEPHONE (OUTPATIENT)
Dept: FAMILY MEDICINE | Facility: CLINIC | Age: 58
End: 2018-04-30

## 2018-04-30 DIAGNOSIS — E11.9 CONTROLLED TYPE 2 DIABETES MELLITUS WITHOUT COMPLICATION, WITHOUT LONG-TERM CURRENT USE OF INSULIN: Primary | ICD-10-CM

## 2018-04-30 DIAGNOSIS — Z12.5 PROSTATE CANCER SCREENING: ICD-10-CM

## 2018-04-30 NOTE — TELEPHONE ENCOUNTER
----- Message from Maureen Burton sent at 4/30/2018  4:44 PM CDT -----  Contact: [phone]//spouse skip   [phone]//spouse skip   Please call in regards to patient appt access, she stated dr nielsen agreed to take her whole family

## 2018-05-02 ENCOUNTER — OFFICE VISIT (OUTPATIENT)
Dept: PODIATRY | Facility: CLINIC | Age: 58
End: 2018-05-02
Payer: COMMERCIAL

## 2018-05-02 VITALS — HEIGHT: 71 IN | BODY MASS INDEX: 26.29 KG/M2 | WEIGHT: 187.81 LBS

## 2018-05-02 DIAGNOSIS — B07.0 PLANTAR WART OF RIGHT FOOT: Primary | ICD-10-CM

## 2018-05-02 PROCEDURE — 99213 OFFICE O/P EST LOW 20 MIN: CPT | Mod: 25,S$GLB,, | Performed by: PODIATRIST

## 2018-05-02 PROCEDURE — 17110 DESTRUCTION B9 LES UP TO 14: CPT | Mod: S$GLB,,, | Performed by: PODIATRIST

## 2018-05-02 PROCEDURE — 99999 PR PBB SHADOW E&M-EST. PATIENT-LVL III: CPT | Mod: PBBFAC,,, | Performed by: PODIATRIST

## 2018-05-02 PROCEDURE — 3008F BODY MASS INDEX DOCD: CPT | Mod: CPTII,S$GLB,, | Performed by: PODIATRIST

## 2018-05-02 NOTE — PATIENT INSTRUCTIONS
Cantharidin Post Care Instructions     Cantharone is a blistering solution used to treat warts. Within 24 hours after application of Catharone  ,a blister forms under the wart . Sometimes there may be blood in the blister fluid. Dont be alarmed at this. The blister may be painful, red, and itch. The area may have watery drainage, redness, and puffiness for about  24 hours.     What you can expect:     4-24 hours :  Blister begins to develop.  Please remove occlusive dressing.  You may experience discomfort to the treatment area; control with bathing and over the counter Tylenol or NSAIDS (Advil/ Aleeve) as per package instructions.   4 Days: Crusted blister falls off, possibly leaving shallow holes. Any open areas should be washed with soap and water every day. Antibiotic ointment should be applied to these areas.   7-14 Days: Healed with some inflammation or redness still there. This will go away on its own. Any warts that remain will be treated again at your next visit. Your skin may lose its color at the site of the treated wart for a short period of time. This treatment generally does not leave a scar unless the area becomes infected or traumatized     Information about warts:   Warts are skin growths caused by viruses. Warts can grow on any part of the body. Although they dont spread easily, wart viruses can be passed to others by direct contact. Warts are also commonly spread in locker rooms and public showers by indirect contact

## 2018-05-03 ENCOUNTER — LAB VISIT (OUTPATIENT)
Dept: LAB | Facility: HOSPITAL | Age: 58
End: 2018-05-03
Attending: FAMILY MEDICINE
Payer: COMMERCIAL

## 2018-05-03 DIAGNOSIS — Z12.5 PROSTATE CANCER SCREENING: ICD-10-CM

## 2018-05-03 DIAGNOSIS — E11.9 CONTROLLED TYPE 2 DIABETES MELLITUS WITHOUT COMPLICATION, WITHOUT LONG-TERM CURRENT USE OF INSULIN: ICD-10-CM

## 2018-05-03 LAB
ALBUMIN SERPL BCP-MCNC: 4 G/DL
ALP SERPL-CCNC: 105 U/L
ALT SERPL W/O P-5'-P-CCNC: 39 U/L
ANION GAP SERPL CALC-SCNC: 9 MMOL/L
AST SERPL-CCNC: 23 U/L
BILIRUB SERPL-MCNC: 0.3 MG/DL
BUN SERPL-MCNC: 11 MG/DL
CALCIUM SERPL-MCNC: 10.1 MG/DL
CHLORIDE SERPL-SCNC: 102 MMOL/L
CHOLEST SERPL-MCNC: 206 MG/DL
CHOLEST/HDLC SERPL: 4.9 {RATIO}
CO2 SERPL-SCNC: 27 MMOL/L
COMPLEXED PSA SERPL-MCNC: 1.5 NG/ML
CREAT SERPL-MCNC: 1 MG/DL
EST. GFR  (AFRICAN AMERICAN): >60 ML/MIN/1.73 M^2
EST. GFR  (NON AFRICAN AMERICAN): >60 ML/MIN/1.73 M^2
ESTIMATED AVG GLUCOSE: 206 MG/DL
GLUCOSE SERPL-MCNC: 216 MG/DL
HBA1C MFR BLD HPLC: 8.8 %
HDLC SERPL-MCNC: 42 MG/DL
HDLC SERPL: 20.4 %
LDLC SERPL CALC-MCNC: 145.8 MG/DL
NONHDLC SERPL-MCNC: 164 MG/DL
POTASSIUM SERPL-SCNC: 4.6 MMOL/L
PROT SERPL-MCNC: 7.7 G/DL
SODIUM SERPL-SCNC: 138 MMOL/L
TRIGL SERPL-MCNC: 91 MG/DL

## 2018-05-03 PROCEDURE — 84153 ASSAY OF PSA TOTAL: CPT

## 2018-05-03 PROCEDURE — 80061 LIPID PANEL: CPT

## 2018-05-03 PROCEDURE — 83036 HEMOGLOBIN GLYCOSYLATED A1C: CPT

## 2018-05-03 PROCEDURE — 80053 COMPREHEN METABOLIC PANEL: CPT

## 2018-05-03 PROCEDURE — 36415 COLL VENOUS BLD VENIPUNCTURE: CPT | Mod: PO

## 2018-05-07 NOTE — PROGRESS NOTES
Subjective:      Patient ID: Austin Smith is a 58 y.o. male.    Chief Complaint: Follow-up (Chem tx for wart)    Austin is a 58 y.o. male who presents to the clinic upon referral from Dr. Clara martinez. provider found  for evaluation and treatment of diabetic feet. Austin has a past medical history of Anticoagulant long-term use; Arthritis; Cataract; DM type 2 (diabetes mellitus, type 2); GERD (gastroesophageal reflux disease); Hyperlipidemia; Hypertension; Iris nevus; Lattice degeneration; Migraine syndrome; Neck pain; and Wears glasses. Patient relates no major problem with feet. Only complaints today consist of pain secondary to calluses bilateral, pain is mostly present with weight bearing.     3/6/18: Patient returns with bilateral foot pain as the calluses have returned, most pain comes from the right lesions. Would like to try and treat them with chemical ablation as discussed last appointment.     3/13/18: Patient returns for follow up chemical ablation of right foot lesions. Feeling much better today, left the dressing on for 2 days, no other complaints at this time.     5/2/18: Patient returns with pain to right plantar foot with associated lesions that returned. Here for further treatment options. Pain with palpation and weight bearing, more so barefoot, somewhat better with supportive shoe wear. No other acute pedal complaints.    Current shoe gear: Tennis shoes    Hemoglobin A1C   Date Value Ref Range Status   05/03/2018 8.8 (H) 4.0 - 5.6 % Final     Comment:     According to ADA guidelines, hemoglobin A1c <7.0% represents  optimal control in non-pregnant diabetic patients. Different  metrics may apply to specific patient populations.   Standards of Medical Care in Diabetes-2016.  For the purpose of screening for the presence of diabetes:  <5.7%     Consistent with the absence of diabetes  5.7-6.4%  Consistent with increasing risk for diabetes   (prediabetes)  >or=6.5%  Consistent with diabetes  Currently,  no consensus exists for use of hemoglobin A1c  for diagnosis of diabetes for children.  This Hemoglobin A1c assay has significant interference with fetal   hemoglobin   (HbF). The results are invalid for patients with abnormal amounts of   HbF,   including those with known Hereditary Persistence   of Fetal Hemoglobin. Heterozygous hemoglobin variants (HbAS, HbAC,   HbAD, HbAE, HbA2) do not significantly interfere with this assay;   however, presence of multiple variants in a sample may impact the %   interference.     07/14/2016 6.5 (H) 4.5 - 6.2 % Final     Comment:     According to ADA guidelines, hemoglobin A1C <7.0% represents  optimal control in non-pregnant diabetic patients.  Different  metrics may apply to specific populations.   Standards of Medical Care in Diabetes - 2016.  For the purpose of screening for the presence of diabetes:  <5.7%     Consistent with the absence of diabetes  5.7-6.4%  Consistent with increasing risk for diabetes   (prediabetes)  >or=6.5%  Consistent with diabetes  Currently no consensus exists for use of hemoglobin A1C  for diagnosis of diabetes for children.     07/15/2014 6.6 (H) 4.5 - 6.2 % Final           Review of Systems   Constitution: Negative for chills and fever.   Cardiovascular: Negative for claudication and leg swelling.   Respiratory: Negative for shortness of breath.    Skin: Positive for suspicious lesions. Negative for itching, nail changes and rash.   Musculoskeletal: Negative for muscle cramps, muscle weakness and myalgias.   Gastrointestinal: Negative for nausea and vomiting.   Neurological: Negative for focal weakness, loss of balance, numbness and paresthesias.           Objective:      Physical Exam   Constitutional: He is oriented to person, place, and time. He appears well-developed and well-nourished. No distress.   Cardiovascular:   Pulses:       Dorsalis pedis pulses are 2+ on the right side, and 2+ on the left side.        Posterior tibial pulses are 2+  on the right side, and 2+ on the left side.   < 3 sec capillary refill time to toes 1-5 bilateral. Toes and feet are warm to touch proximally with normal distal cooling b/l. There is some hair growth on the feet and toes b/l. There is no edema b/l. No spider veins or varicosities present b/l.      Musculoskeletal:   Equinus noted b/l ankles with < 10 deg DF noted. MMT 5/5 in DF/PF/Inv/Ev resistance with no reproduction of pain in any direction. Passive range of motion of ankle and pedal joints is painless b/l.     Feet:   Right Foot:   Protective Sensation: 10 sites tested. 10 sites sensed.   Left Foot:   Protective Sensation: 10 sites tested. 10 sites sensed.   Neurological: He is alert and oriented to person, place, and time. He has normal strength. He displays no atrophy and no tremor. No sensory deficit. He exhibits normal muscle tone.   Negative tinel sign bilateral.   Skin: Skin is warm, dry and intact. Lesion noted. No abrasion, no bruising, no burn, no ecchymosis, no laceration, no petechiae and no rash noted. He is not diaphoretic. No cyanosis or erythema. No pallor. Nails show no clubbing.   Skin temperature, texture and turgor within normal limits.    Verrucous lesion noted at the right plantar forefoot and plantar heel. Spongy core with a disruption of skin lines, papular bleeding upon debridement and pain with side-to side compression.    Left medial heel there is a hyperkeratotic lesion after trimming the skin line remain intact.    Psychiatric: He has a normal mood and affect. His behavior is normal.             Assessment:       Encounter Diagnosis   Name Primary?    Plantar wart of right foot Yes         Plan:       Austin was seen today for follow-up.    Diagnoses and all orders for this visit:    Plantar wart of right foot      I counseled the patient on his conditions, their implications and medical management.    Discussed chemical destruction of skin lesions with the patient including benefits  of no scar and faster healing times, risks of pain, recurrence (regardless of means of destruction), and need for protracted or future surgical procedures.  Patient relates understanding.    Debrided warts x3 left foot to pinpoint stippled bleeding with sterile #15 blade, and applied canthrone topically with tape occlusion.  Written post op care instructions were handed out, he is to leave the occluded tape on for up to but not to exceed 24 hours. Return in 1 week for follow up care.     Austen Rios DPM

## 2018-05-08 ENCOUNTER — OFFICE VISIT (OUTPATIENT)
Dept: PODIATRY | Facility: CLINIC | Age: 58
End: 2018-05-08
Payer: COMMERCIAL

## 2018-05-08 VITALS — BODY MASS INDEX: 26.29 KG/M2 | HEIGHT: 71 IN | WEIGHT: 187.81 LBS

## 2018-05-08 DIAGNOSIS — B07.0 PLANTAR WART OF RIGHT FOOT: Primary | ICD-10-CM

## 2018-05-08 DIAGNOSIS — Z98.890 POST-OPERATIVE STATE: ICD-10-CM

## 2018-05-08 PROCEDURE — 99024 POSTOP FOLLOW-UP VISIT: CPT | Mod: S$GLB,,, | Performed by: PODIATRIST

## 2018-05-08 PROCEDURE — 99999 PR PBB SHADOW E&M-EST. PATIENT-LVL III: CPT | Mod: PBBFAC,,, | Performed by: PODIATRIST

## 2018-05-08 NOTE — PROGRESS NOTES
Subjective:      Patient ID: Austin Smith is a 58 y.o. male.    Chief Complaint: Follow-up (1 wk post chem tx - right foot ) and Diabetes Mellitus (PCP:  Dr Jernigan (has appt 5/23); HgbA1c: 5/3/18  8.8)    Austin is a 58 y.o. male who presents to the clinic upon referral from Dr. Elizabeth ref. provider found  for evaluation and treatment of diabetic feet. Austin has a past medical history of Anticoagulant long-term use; Arthritis; Cataract; DM type 2 (diabetes mellitus, type 2); GERD (gastroesophageal reflux disease); Hyperlipidemia; Hypertension; Iris nevus; Lattice degeneration; Migraine syndrome; Neck pain; and Wears glasses. Patient relates no major problem with feet. Only complaints today consist of pain secondary to calluses bilateral, pain is mostly present with weight bearing.     3/6/18: Patient returns with bilateral foot pain as the calluses have returned, most pain comes from the right lesions. Would like to try and treat them with chemical ablation as discussed last appointment.     3/13/18: Patient returns for follow up chemical ablation of right foot lesions. Feeling much better today, left the dressing on for 2 days, no other complaints at this time.     5/2/18: Patient returns with pain to right plantar foot with associated lesions that returned. Here for further treatment options. Pain with palpation and weight bearing, more so barefoot, somewhat better with supportive shoe wear. No other acute pedal complaints.    5/8/18: Patient reports severe burning pain the first night so he took off the tape and washed off the medicine. There was still some burning until Friday. Feels better today no new pedal complaints at this time.     Current shoe gear: Tennis shoes    Hemoglobin A1C   Date Value Ref Range Status   05/03/2018 8.8 (H) 4.0 - 5.6 % Final     Comment:     According to ADA guidelines, hemoglobin A1c <7.0% represents  optimal control in non-pregnant diabetic patients. Different  metrics may  apply to specific patient populations.   Standards of Medical Care in Diabetes-2016.  For the purpose of screening for the presence of diabetes:  <5.7%     Consistent with the absence of diabetes  5.7-6.4%  Consistent with increasing risk for diabetes   (prediabetes)  >or=6.5%  Consistent with diabetes  Currently, no consensus exists for use of hemoglobin A1c  for diagnosis of diabetes for children.  This Hemoglobin A1c assay has significant interference with fetal   hemoglobin   (HbF). The results are invalid for patients with abnormal amounts of   HbF,   including those with known Hereditary Persistence   of Fetal Hemoglobin. Heterozygous hemoglobin variants (HbAS, HbAC,   HbAD, HbAE, HbA2) do not significantly interfere with this assay;   however, presence of multiple variants in a sample may impact the %   interference.     07/14/2016 6.5 (H) 4.5 - 6.2 % Final     Comment:     According to ADA guidelines, hemoglobin A1C <7.0% represents  optimal control in non-pregnant diabetic patients.  Different  metrics may apply to specific populations.   Standards of Medical Care in Diabetes - 2016.  For the purpose of screening for the presence of diabetes:  <5.7%     Consistent with the absence of diabetes  5.7-6.4%  Consistent with increasing risk for diabetes   (prediabetes)  >or=6.5%  Consistent with diabetes  Currently no consensus exists for use of hemoglobin A1C  for diagnosis of diabetes for children.     07/15/2014 6.6 (H) 4.5 - 6.2 % Final           Review of Systems   Constitution: Negative for chills and fever.   Cardiovascular: Negative for claudication and leg swelling.   Respiratory: Negative for shortness of breath.    Skin: Positive for suspicious lesions. Negative for itching, nail changes and rash.   Musculoskeletal: Negative for muscle cramps, muscle weakness and myalgias.   Gastrointestinal: Negative for nausea and vomiting.   Neurological: Negative for focal weakness, loss of balance, numbness and  paresthesias.           Objective:      Physical Exam   Constitutional: He is oriented to person, place, and time. He appears well-developed and well-nourished. No distress.   Cardiovascular:   Pulses:       Dorsalis pedis pulses are 2+ on the right side, and 2+ on the left side.        Posterior tibial pulses are 2+ on the right side, and 2+ on the left side.   < 3 sec capillary refill time to toes 1-5 bilateral. Toes and feet are warm to touch proximally with normal distal cooling b/l. There is some hair growth on the feet and toes b/l. There is no edema b/l. No spider veins or varicosities present b/l.      Musculoskeletal:   Equinus noted b/l ankles with < 10 deg DF noted. MMT 5/5 in DF/PF/Inv/Ev resistance with no reproduction of pain in any direction. Passive range of motion of ankle and pedal joints is painless b/l.     Feet:   Right Foot:   Protective Sensation: 10 sites tested. 10 sites sensed.   Left Foot:   Protective Sensation: 10 sites tested. 10 sites sensed.   Neurological: He is alert and oriented to person, place, and time. He has normal strength. He displays no atrophy and no tremor. No sensory deficit. He exhibits normal muscle tone.   Negative tinel sign bilateral.   Skin: Skin is warm, dry and intact. Lesion noted. No abrasion, no bruising, no burn, no ecchymosis, no laceration, no petechiae and no rash noted. He is not diaphoretic. No cyanosis or erythema. No pallor. Nails show no clubbing.   Skin temperature, texture and turgor within normal limits.    Verrucous lesions noted at the right plantar forefoot and plantar heel resolving, there is still minor spongy center noted, however much improved with medication. No pain with palpation.    Left medial heel there is a hyperkeratotic lesion after trimming the skin line remain intact.    Psychiatric: He has a normal mood and affect. His behavior is normal.             Assessment:       Encounter Diagnoses   Name Primary?    Plantar wart of right  foot Yes    Post-operative state          Plan:       Austin was seen today for follow-up and diabetes mellitus.    Diagnoses and all orders for this visit:    Plantar wart of right foot    Post-operative state      I counseled the patient on his conditions, their implications and medical management.    He will watch the area over the next couple of weeks, does not want to do the cantherone application again in the future so if the pain returns I will prescribe a wart cream from [facility] that he can apply daily. He will call if he needs the prescription in 2-3 weeks, can follow up after using the prescription for a couple of weeks.    Austen Rios DPM

## 2018-05-09 ENCOUNTER — PATIENT OUTREACH (OUTPATIENT)
Dept: ADMINISTRATIVE | Facility: HOSPITAL | Age: 58
End: 2018-05-09

## 2018-05-09 NOTE — PROGRESS NOTES
Health Maintenance Due   Topic Date Due    Hepatitis C Screening  1960    TETANUS VACCINE  03/17/1980    Pneumococcal PPSV23 (Medium Risk) (1) 03/17/1980    Low Dose Statin  03/17/1983    Colonoscopy  03/17/2012    Eye Exam  12/16/2015

## 2018-05-17 ENCOUNTER — TELEPHONE (OUTPATIENT)
Dept: ADMINISTRATIVE | Facility: HOSPITAL | Age: 58
End: 2018-05-17

## 2018-05-17 NOTE — LETTER
May 17, 2018    Austin Smith  [address] [place] [address]             [facility]  [address]  [facility] [address]  Phone: [phone] Dear Mr. Smith:    We have tried to reach you by [facility] email unsuccessfully.      [facility] is committed to your overall health.  To help you get the most out of each of your visits, we will review your information to make sure you are up to date on all of your recommended tests and/or procedures.       Dr. Jernigan   has found that your chart shows you may be due for the following:     One-time Hepatitis C Screening lab test(a viral condition that can harm the liver)   Tetanus Immunization   Pneumonia immunization   Colon cancer screening     Annual diabetic eye exam, If you have not had an eye exam in the past year, we now have a  Retinal Camera at the [facility].  If we do this exam the same day you see a member of your Primary Care team, we can save you from having to pay a second co pay.       If you have had any of the above done at another facility, please bring the records or information with you so that your record at [facility] will be complete.  If you would like to schedule any of these, please contact me.      If you are currently taking medication , please bring it with you to your appointment for review.     Sincerely,    Carol Zazueta  Clinical Care Coordinator  [facility]  [address] [place] [address]  Phone: [phone]   Fax: [phone]

## 2018-05-23 ENCOUNTER — OFFICE VISIT (OUTPATIENT)
Dept: FAMILY MEDICINE | Facility: CLINIC | Age: 58
End: 2018-05-23
Payer: COMMERCIAL

## 2018-05-23 VITALS
HEART RATE: 62 BPM | DIASTOLIC BLOOD PRESSURE: 90 MMHG | OXYGEN SATURATION: 98 % | HEIGHT: 71 IN | SYSTOLIC BLOOD PRESSURE: 150 MMHG | RESPIRATION RATE: 18 BRPM | WEIGHT: 188.06 LBS | BODY MASS INDEX: 26.33 KG/M2

## 2018-05-23 DIAGNOSIS — I10 HYPERTENSION, UNSPECIFIED TYPE: ICD-10-CM

## 2018-05-23 DIAGNOSIS — M50.30 DDD (DEGENERATIVE DISC DISEASE), CERVICAL: ICD-10-CM

## 2018-05-23 DIAGNOSIS — M50.30 DEGENERATION OF CERVICAL INTERVERTEBRAL DISC: ICD-10-CM

## 2018-05-23 DIAGNOSIS — E78.5 HYPERLIPIDEMIA, UNSPECIFIED HYPERLIPIDEMIA TYPE: ICD-10-CM

## 2018-05-23 DIAGNOSIS — Z00.00 PREVENTATIVE HEALTH CARE: Primary | ICD-10-CM

## 2018-05-23 DIAGNOSIS — M47.812 OSTEOARTHRITIS OF CERVICAL SPINE, UNSPECIFIED SPINAL OSTEOARTHRITIS COMPLICATION STATUS: ICD-10-CM

## 2018-05-23 DIAGNOSIS — E11.21 DIABETIC NEPHROPATHY ASSOCIATED WITH TYPE 2 DIABETES MELLITUS: ICD-10-CM

## 2018-05-23 PROCEDURE — 3077F SYST BP >= 140 MM HG: CPT | Mod: CPTII,S$GLB,, | Performed by: FAMILY MEDICINE

## 2018-05-23 PROCEDURE — 90715 TDAP VACCINE 7 YRS/> IM: CPT | Mod: S$GLB,,, | Performed by: FAMILY MEDICINE

## 2018-05-23 PROCEDURE — 99999 PR PBB SHADOW E&M-EST. PATIENT-LVL IV: CPT | Mod: PBBFAC,,, | Performed by: FAMILY MEDICINE

## 2018-05-23 PROCEDURE — 3045F PR MOST RECENT HEMOGLOBIN A1C LEVEL 7.0-9.0%: CPT | Mod: CPTII,S$GLB,, | Performed by: FAMILY MEDICINE

## 2018-05-23 PROCEDURE — 90471 IMMUNIZATION ADMIN: CPT | Mod: S$GLB,,, | Performed by: FAMILY MEDICINE

## 2018-05-23 PROCEDURE — 3080F DIAST BP >= 90 MM HG: CPT | Mod: CPTII,S$GLB,, | Performed by: FAMILY MEDICINE

## 2018-05-23 PROCEDURE — 99396 PREV VISIT EST AGE 40-64: CPT | Mod: 25,S$GLB,, | Performed by: FAMILY MEDICINE

## 2018-05-23 RX ORDER — ROSUVASTATIN CALCIUM 20 MG/1
20 TABLET, COATED ORAL DAILY
Qty: 30 TABLET | Refills: 11 | Status: SHIPPED | OUTPATIENT
Start: 2018-05-23 | End: 2019-10-29 | Stop reason: SDUPTHER

## 2018-05-23 RX ORDER — LISINOPRIL AND HYDROCHLOROTHIAZIDE 20; 25 MG/1; MG/1
1 TABLET ORAL DAILY
Qty: 30 TABLET | Refills: 11 | Status: SHIPPED | OUTPATIENT
Start: 2018-05-23 | End: 2019-05-07 | Stop reason: SDUPTHER

## 2018-05-23 RX ORDER — ESCITALOPRAM OXALATE 10 MG/1
10 TABLET ORAL DAILY
Qty: 30 TABLET | Refills: 11 | Status: SHIPPED | OUTPATIENT
Start: 2018-05-23 | End: 2018-11-12

## 2018-05-23 RX ORDER — METFORMIN HYDROCHLORIDE 750 MG/1
750 TABLET, EXTENDED RELEASE ORAL 2 TIMES DAILY WITH MEALS
Qty: 60 TABLET | Refills: 11 | Status: SHIPPED | OUTPATIENT
Start: 2018-05-23 | End: 2019-07-17 | Stop reason: SDUPTHER

## 2018-05-23 NOTE — PROGRESS NOTES
Subjective:       Patient ID: Austin Smith is a 58 y.o. male.    Chief Complaint: Establish Care    Here today to establish care.  He was previously seeing Dr. Deleon.    Not taking any meds presently    He reports being irritable, edgy, anxious, coates, decreased focus.  He is frustrated due to his progressive neck pain.  He had taken zoloft in the past    Cervical DDD, spinal stenosis - he is supposed to consult with a neurosurgeon, and needs an appointment    Diabetes - following a diabetic diet; He was perviously on metoformin  HLD - previously on zocor  HTN - previously on lisinopril HCT            Past Medical History:   Diagnosis Date    Anticoagulant long-term use     ASA 81mg    Arthritis     Cataract     OU    DM type 2 (diabetes mellitus, type 2)     GERD (gastroesophageal reflux disease)     Hyperlipidemia     Hypertension     Iris nevus     OS    Lattice degeneration     Migraine syndrome     Neck pain     Wears glasses        Past Surgical History:   Procedure Laterality Date    Epidural Steroid injection      Pain Management    HERNIA REPAIR Left     inguinal as child    ROTATOR CUFF REPAIR      left       Review of patient's allergies indicates:   Allergen Reactions    No known allergies        Social History     Social History    Marital status:      Spouse name: N/A    Number of children: 2    Years of education: N/A     Occupational History    retired      Social History Main Topics    Smoking status: Current Every Day Smoker     Packs/day: 1.00     Types: Cigarettes     Start date: 8/19/2002    Smokeless tobacco: Never Used    Alcohol use Yes      Comment: few beers per week    Drug use: No    Sexual activity: Not on file     Other Topics Concern    Not on file     Social History Narrative    No narrative on file       No current outpatient prescriptions on file prior to visit.     No current facility-administered medications on file prior to visit.   "      No family history on file.    Review of Systems   Constitutional: Negative for appetite change, chills, fever and unexpected weight change.   HENT: Negative for sore throat and trouble swallowing.    Eyes: Negative for pain and visual disturbance.   Respiratory: Negative for cough, chest tightness, shortness of breath and wheezing.    Cardiovascular: Negative for chest pain, palpitations and leg swelling.   Gastrointestinal: Negative for abdominal pain, blood in stool, constipation, diarrhea and nausea.   Genitourinary: Negative for difficulty urinating, dysuria and hematuria.   Musculoskeletal: Negative for arthralgias, gait problem and neck pain.   Skin: Negative for rash and wound.   Neurological: Negative for dizziness, weakness, light-headedness and headaches.   Hematological: Negative for adenopathy.   Psychiatric/Behavioral: Negative for dysphoric mood. The patient is nervous/anxious.        Objective:      BP (!) 150/90   Pulse 62   Resp 18   Ht 5' 11" (1.803 m)   Wt 85.3 kg (188 lb 0.8 oz)   SpO2 98%   BMI 26.23 kg/m²   Physical Exam   Constitutional: He is oriented to person, place, and time. He appears well-developed and well-nourished. He is active. No distress.   HENT:   Head: Normocephalic and atraumatic.   Right Ear: External ear normal.   Left Ear: External ear normal.   Mouth/Throat: Uvula is midline, oropharynx is clear and moist and mucous membranes are normal. No oropharyngeal exudate.   Eyes: Conjunctivae, EOM and lids are normal. Pupils are equal, round, and reactive to light.   Neck: Normal range of motion, full passive range of motion without pain and phonation normal. Neck supple. No thyroid mass and no thyromegaly present.   Cardiovascular: Normal rate, regular rhythm, normal heart sounds and intact distal pulses.  Exam reveals no gallop and no friction rub.    No murmur heard.  Pulmonary/Chest: Effort normal and breath sounds normal. No respiratory distress. He has no wheezes. " He has no rales.   Musculoskeletal: Normal range of motion.   Lymphadenopathy:     He has no cervical adenopathy.   Neurological: He is alert and oriented to person, place, and time. No cranial nerve deficit. Coordination normal.   Skin: Skin is warm and dry.   Psychiatric: He has a normal mood and affect. His speech is normal and behavior is normal. Judgment and thought content normal.       Results for orders placed or performed in visit on 05/03/18   Microalbumin/creatinine urine ratio   Result Value Ref Range    Microalbum.,U,Random 64.0 ug/mL    Creatinine, Random Ur 170.0 23.0 - 375.0 mg/dL    Microalb Creat Ratio 37.6 (H) 0.0 - 30.0 ug/mg       Labs 5/3/18 reviewed    Assessment:       1. Preventative health care    2. Degeneration of cervical intervertebral disc    3. DDD (degenerative disc disease), cervical    4. Osteoarthritis of cervical spine, unspecified spinal osteoarthritis complication status    5. Hypertension, unspecified type    6. Hyperlipidemia, unspecified hyperlipidemia type    7. Diabetic nephropathy associated with type 2 diabetes mellitus    8. Uncontrolled type 2 diabetes mellitus with microalbuminuria, without long-term current use of insulin        Plan:       Preventative health care  -     (In Office Administered) Tdap Vaccine    Degeneration of cervical intervertebral disc  -     Ambulatory referral to Neurosurgery    DDD (degenerative disc disease), cervical  -     Ambulatory referral to Neurosurgery    Osteoarthritis of cervical spine, unspecified spinal osteoarthritis complication status  -     Ambulatory referral to Neurosurgery    Hypertension, unspecified type    Hyperlipidemia, unspecified hyperlipidemia type    Diabetic nephropathy associated with type 2 diabetes mellitus    Uncontrolled type 2 diabetes mellitus with microalbuminuria, without long-term current use of insulin  -     Ambulatory referral to Optometry    Other orders  -     escitalopram oxalate (LEXAPRO) 10 MG  tablet; Take 1 tablet (10 mg total) by mouth once daily.  Dispense: 30 tablet; Refill: 11  -     metFORMIN (GLUCOPHAGE-XR) 750 MG 24 hr tablet; Take 1 tablet (750 mg total) by mouth 2 (two) times daily with meals.  Dispense: 60 tablet; Refill: 11  -     rosuvastatin (CRESTOR) 20 MG tablet; Take 1 tablet (20 mg total) by mouth once daily.  Dispense: 30 tablet; Refill: 11  -     lisinopril-hydrochlorothiazide (PRINZIDE,ZESTORETIC) 20-25 mg Tab; Take 1 tablet by mouth once daily.  Dispense: 30 tablet; Refill: 11        F/u 4 weeks to reassess mood on Lexapro  Resume other meds  Will arrange labs and f/u regarding other issues at that appointment  Not interested in quitting smoking at this time.  Counseled on regular exercise, maintenance of a healthy weight, balanced diet rich in fruits/vegetables and lean protein, and avoidance of unhealthy habits like smoking and excessive alcohol intake.

## 2018-05-31 ENCOUNTER — TELEPHONE (OUTPATIENT)
Dept: FAMILY MEDICINE | Facility: CLINIC | Age: 58
End: 2018-05-31

## 2018-05-31 NOTE — TELEPHONE ENCOUNTER
----- Message from Betty Corrigan sent at 5/31/2018 11:20 AM CDT -----  Patient states that he has not heard from the neurology department.  Call patient at [phone].

## 2018-06-04 ENCOUNTER — TELEPHONE (OUTPATIENT)
Dept: FAMILY MEDICINE | Facility: CLINIC | Age: 58
End: 2018-06-04

## 2018-06-04 ENCOUNTER — OFFICE VISIT (OUTPATIENT)
Dept: NEUROSURGERY | Facility: CLINIC | Age: 58
End: 2018-06-04
Payer: COMMERCIAL

## 2018-06-04 VITALS
SYSTOLIC BLOOD PRESSURE: 124 MMHG | DIASTOLIC BLOOD PRESSURE: 70 MMHG | WEIGHT: 181.19 LBS | HEART RATE: 89 BPM | HEIGHT: 71 IN | BODY MASS INDEX: 25.37 KG/M2

## 2018-06-04 DIAGNOSIS — M50.30 DEGENERATION OF CERVICAL INTERVERTEBRAL DISC: Primary | ICD-10-CM

## 2018-06-04 PROCEDURE — 3078F DIAST BP <80 MM HG: CPT | Mod: CPTII,S$GLB,, | Performed by: NEUROLOGICAL SURGERY

## 2018-06-04 PROCEDURE — 3074F SYST BP LT 130 MM HG: CPT | Mod: CPTII,S$GLB,, | Performed by: NEUROLOGICAL SURGERY

## 2018-06-04 PROCEDURE — 99204 OFFICE O/P NEW MOD 45 MIN: CPT | Mod: S$GLB,,, | Performed by: NEUROLOGICAL SURGERY

## 2018-06-04 PROCEDURE — 3008F BODY MASS INDEX DOCD: CPT | Mod: CPTII,S$GLB,, | Performed by: NEUROLOGICAL SURGERY

## 2018-06-04 PROCEDURE — 99999 PR PBB SHADOW E&M-EST. PATIENT-LVL III: CPT | Mod: PBBFAC,,, | Performed by: NEUROLOGICAL SURGERY

## 2018-06-04 NOTE — TELEPHONE ENCOUNTER
----- Message from Melissa Sevilla LPN sent at 6/4/2018 12:41 PM CDT -----  Dr. Mcnulty is requesting pt be seen by Dr. Jernigan for smoking cessation. Please call pt to schedule appt. Thanks in advance.

## 2018-06-04 NOTE — PROGRESS NOTES
"Neurosurgery History and Physical    Patient ID: Austin Smith is a 58 y.o. male.    Chief Complaint   Patient presents with    Cervical Spine Pain (C-spine)     pt states pain has been going on for years not accident related; pt states pain is "in neck, in spine" ; pt states he has "no spinal fluid" so the doctor has given him 2 DEVAN with no help; pain does radiate into shoulers at times, but mainly up scalp into forehead causing headaches, blurry vision and dizziness; denies ringing in the ears and bowel and bladder issues         Review of Systems   Constitutional: Positive for activity change.   HENT: Negative.    Eyes: Negative.    Respiratory: Negative.    Cardiovascular: Negative.    Gastrointestinal: Negative.    Endocrine: Negative.    Genitourinary: Negative.    Musculoskeletal: Positive for neck pain and neck stiffness.   Skin: Negative.    Allergic/Immunologic: Negative.    Neurological: Positive for light-headedness. Negative for weakness and numbness.   Hematological: Negative.    Psychiatric/Behavioral: Negative.        Past Medical History:   Diagnosis Date    Anticoagulant long-term use     ASA 81mg    Arthritis     Cataract     OU    DM type 2 (diabetes mellitus, type 2)     GERD (gastroesophageal reflux disease)     Hyperlipidemia     Hypertension     Iris nevus     OS    Lattice degeneration     Migraine syndrome     Neck pain     Wears glasses      Social History     Social History    Marital status:      Spouse name: N/A    Number of children: 2    Years of education: N/A     Occupational History    retired      Social History Main Topics    Smoking status: Current Every Day Smoker     Packs/day: 1.00     Types: Cigarettes     Start date: 8/19/2002    Smokeless tobacco: Never Used    Alcohol use Yes      Comment: few beers per week    Drug use: No    Sexual activity: Not on file     Other Topics Concern    Not on file     Social History Narrative    No " "narrative on file     History reviewed. No pertinent family history.  Review of patient's allergies indicates:   Allergen Reactions    No known allergies        Current Outpatient Prescriptions:     escitalopram oxalate (LEXAPRO) 10 MG tablet, Take 1 tablet (10 mg total) by mouth once daily., Disp: 30 tablet, Rfl: 11    lisinopril-hydrochlorothiazide (PRINZIDE,ZESTORETIC) 20-25 mg Tab, Take 1 tablet by mouth once daily., Disp: 30 tablet, Rfl: 11    metFORMIN (GLUCOPHAGE-XR) 750 MG 24 hr tablet, Take 1 tablet (750 mg total) by mouth 2 (two) times daily with meals., Disp: 60 tablet, Rfl: 11    rosuvastatin (CRESTOR) 20 MG tablet, Take 1 tablet (20 mg total) by mouth once daily., Disp: 30 tablet, Rfl: 11  Blood pressure 124/70, pulse 89, height 5' 11" (1.803 m), weight 82.2 kg (181 lb 3.5 oz).      Neurologic Exam     Mental Status   Oriented to person, place, and time.   Attention: normal. Concentration: normal.   Speech: speech is normal   Level of consciousness: alert  Knowledge: good.     Cranial Nerves     CN II   Visual acuity: normal    CN III, IV, VI   Pupils are equal, round, and reactive to light.  Extraocular motions are normal.     CN V   Facial sensation intact.     CN VII   Facial expression full, symmetric.     CN VIII   Hearing: intact    CN IX, X   Palate: symmetric    CN XI   CN XI normal.     CN XII   CN XII normal.     Motor Exam   Muscle bulk: normal  Overall muscle tone: normal  Right arm pronator drift: absent  Left arm pronator drift: absent    Strength   Right deltoid: 5/5  Left deltoid: 5/5  Right biceps: 5/5  Left biceps: 5/5  Right triceps: 5/5  Left triceps: 5/5  Right wrist flexion: 5/5  Left wrist flexion: 5/5  Right wrist extension: 5/5  Left wrist extension: 5/5  Right interossei: 5/5  Left interossei: 5/5  Right iliopsoas: 5/5  Left iliopsoas: 5/5  Right quadriceps: 5/5  Left quadriceps: 5/5  Right hamstrin/5  Left hamstrin/5  Right anterior tibial: 5/5  Left anterior " tibial: 5/5  Right posterior tibial: 5/5  Left posterior tibial: 5/5  Right peroneal: 5/5  Left peroneal: 5/5  Right gastroc: 5/5  Left gastroc: 5/5    Sensory Exam   Light touch normal.     Gait, Coordination, and Reflexes     Gait  Gait: normal    Coordination   Romberg: negative  Finger to nose coordination: normal    Tremor   Resting tremor: absent    Reflexes   Right brachioradialis: 1+  Left brachioradialis: 1+  Right biceps: 2+  Left biceps: 2+  Right triceps: 1+  Left triceps: 1+  Right patellar: 2+  Left patellar: 2+  Right achilles: 1+  Left achilles: 1+  Right plantar: normal  Left plantar: normal  Right Burr: present  Left Burr: absent  Right ankle clonus: absent  Left ankle clonus: absent      Physical Exam   Constitutional: He is oriented to person, place, and time. He appears well-developed and well-nourished.   HENT:   Head: Normocephalic and atraumatic.   Eyes: EOM are normal. Pupils are equal, round, and reactive to light.   Neck: Normal range of motion. Neck supple.   Cardiovascular: Normal rate and regular rhythm.    Pulmonary/Chest: Effort normal.   Abdominal: Soft.   Musculoskeletal: Normal range of motion.   Neurological: He is alert and oriented to person, place, and time. He has a normal Finger-Nose-Finger Test and a normal Romberg Test. Gait normal.   Reflex Scores:       Tricep reflexes are 1+ on the right side and 1+ on the left side.       Bicep reflexes are 2+ on the right side and 2+ on the left side.       Brachioradialis reflexes are 1+ on the right side and 1+ on the left side.       Patellar reflexes are 2+ on the right side and 2+ on the left side.       Achilles reflexes are 1+ on the right side and 1+ on the left side.  Skin: Skin is warm and dry.   Psychiatric: He has a normal mood and affect. His speech is normal and behavior is normal. Judgment and thought content normal.   Nursing note and vitals reviewed.      Vital Signs  Pulse: 89  BP: 124/70  Pain Score:   8  Pain  "Loc: Neck  Height and Weight  Height: 5' 11" (180.3 cm)  Weight: 82.2 kg (181 lb 3.5 oz)  BSA (Calculated - sq m): 2.03 sq meters  BMI (Calculated): 25.3  Weight in (lb) to have BMI = 25: 178.9]    Provider dictation:  I reviewed the imaging. He has significant disc disease at C5-C6 with disc space collapse, Modic changes and osteophyte formation. There is significant stenosis but no cyn spinal cord compression. He C/O axial neck pain radiating to his skull and to both shoulder occasionally. He denies any radiation or numbness or paresthesias in his arms or hands. The pain is exacerbated by physical activity and alleviated by rest. He has had DEVAN with no significant improvement.     On exam, he has full strength with no convincing myelopathy, although he did have a right Babinski.    I have explained that I suspect a large part of his neck pain is due to the C5-C6 disc degeneration. I have explained that fusion for axial pain is not as successful as for neural element decompression. However, the relatively focal nature of his radiographic cervical spine disease could make him a candidate for a C5-C6 ACDF if he fails multimodality treatment. He has not yet tried PT so we will order this.    Most importantly, I have stressed that he will need to quit smoking if he wants to be considered for surgery. I have instructed him to ask Dr Jernigan for help regarding this and to schedule a F/U once he has quit to discuss the result of his PT.     Visit Diagnosis:  Degeneration of cervical intervertebral disc      "

## 2018-06-04 NOTE — LETTER
June 4, 2018      Suleman Jernigan MD  [address] [facility] [address] [place] [address]           [facility]  [address] [facility] [address] [place] [address]  Phone: [phone]  Fax: [phone]          Patient: Austin Smith   MR Number: [id number]   YOB: 1960   Date of Visit: 6/4/2018       Dear Dr. Suleman Jernigan:    Thank you for referring Austin Smith to me for evaluation. Attached you will find relevant portions of my assessment and plan of care.    If you have questions, please do not hesitate to call me. I look forward to following Austin Smith along with you.    Sincerely,    Leo Mcnulty MD    Enclosure  CC:  No Recipients    If you would like to receive this communication electronically, please contact [email or web address] or [phone] to request more information on [employer] Link access.    For providers and/or their staff who would like to refer a patient to [facility], please contact us through our one-stop-shop provider referral line, [facility], at [phone].    If you feel you have received this communication in error or would no longer like to receive these types of communications, please e-mail [email or web address]

## 2018-06-04 NOTE — TELEPHONE ENCOUNTER
Spoke with patient and he states that he will keep his regularly scheduled appt for 6/20/18. Refused a sooner appt.

## 2018-06-18 ENCOUNTER — CLINICAL SUPPORT (OUTPATIENT)
Dept: REHABILITATION | Facility: HOSPITAL | Age: 58
End: 2018-06-18
Attending: NEUROLOGICAL SURGERY
Payer: COMMERCIAL

## 2018-06-18 DIAGNOSIS — M50.30 DDD (DEGENERATIVE DISC DISEASE), CERVICAL: Primary | ICD-10-CM

## 2018-06-18 PROCEDURE — 97161 PT EVAL LOW COMPLEX 20 MIN: CPT | Mod: PN

## 2018-06-18 NOTE — PLAN OF CARE
TIME RECORD    Date: 06/18/2018    Start Time:  1010  Stop Time:  1055    PROCEDURES:    TIMED  Procedure Time Min.    Start:  Stop:     Start:  Stop:     Start:  Stop:     Start:  Stop:          UNTIMED  Procedure Time Min.   EVAL Start:  Stop:     Start:  Stop:      Total Timed Minutes:    Total Timed Units:    Total Untimed Units:  1  Charges Billed/# of units:  1    OUTPATIENT PHYSICAL THERAPY   PATIENT EVALUATION  Onset Date: Insidious, chronic problem.   Primary Diagnosis:   1. DDD (degenerative disc disease), cervical       Treatment Diagnosis: Neck pain.  Past Medical History:   Diagnosis Date    Anticoagulant long-term use     ASA 81mg    Arthritis     Cataract     OU    DM type 2 (diabetes mellitus, type 2)     GERD (gastroesophageal reflux disease)     Hyperlipidemia     Hypertension     Iris nevus     OS    Lattice degeneration     Migraine syndrome     Neck pain     Wears glasses      Precautions: STD  Prior Therapy: NO.  Medications: Austin Neville Luis has a current medication list which includes the following prescription(s): escitalopram oxalate, lisinopril-hydrochlorothiazide, metformin, and rosuvastatin.  Nutrition:  Normal  History of Present Illness: Pt with chronic neck pain for many yrs,pain increased in last several months.  Prior Level of Function: Independent  Social History: Retired.  Place of Residence (Steps/Adaptations): In elevated house with wife.  Functional Deficits Leading to Referral/Nature of Injury: Difficulties with ADLs,homemaking,functional activities.  Patient Therapy Goals: No pain.    Subjective     Austin Smith states .    Pain:  Location: neck   Description: Aching, Dull, Throbbing, Sharp and Variable  Activities Which Increase Pain: Bending, Night Time, Extension, Flexing and Lifting  Activities Which Decrease Pain: relaxation, pain medication and rest  Pain Scale: 4/10 at best 7/10 now  9/10 at worst    Objective     Posture: Head  forward,scoliosis,rt shoulder elevated.  Palpation: C3-T1 , both upper traps tender.  Sensation: Intact.  DTRs:  Range of Motion/Strength: strength of CS is grossly 3-/5.  Cervical/Thoracic/Lumbar AROM: Pain/Dysfunction with Movement:   Flexion    20    Extension   25    Right side bending    25    Left side bending     25    Right rotation   65    Left rotation    55         Flexibility: Decreased in C/spine.  Gait: Without AD  Analysis: SBA - guarded.  Bed Mobility:Independent  Transfers: Independent  Special Tests: Compression test on CS pos (+),  RT;80, LT;70#, pt is rt handed.  Other: NDI 27/50.  Treatment: EVAL.    Assessment       Initial Assessment (Pertinent finding, problem list and factors affecting outcome): Pt presents ROM and strength deficits of CS,poor posture,decreased function due to neck pain and above listed deficits.  Rehab Potiential: good    Short Term Goals (3 Weeks): 1.Decrease pain x 1 grade. 2.Start HEP.  Long Term Goals (6 Weeks): 1.Pain 0-4/10. 2.Independen HEP. 3.ROM of CS WNL/WFL. 4.Normalize posture. 5.Strength >4/5. 6.NDI 15/50. Restore previous EVELIO.    Plan     Certification Period: 6/18/18 to 8/1/18  Recommended Treatment Plan: 2 times per week for 6 weeks: Cervical/Lumbar Traction, Electrical Stimulation PRN, Manual Therapy, Moist Heat/ Ice, Patient Education, Therapeutic Activites, Therapeutic Exercise and Ultrasound  Other Recommendations: Dry needling PRN,IMS PRN.      Therapist: John Eli, PT    I CERTIFY THE NEED FOR THESE SERVICES FURNISHED UNDER THIS PLAN OF TREATMENT AND WHILE UNDER MY CARE    Physician's comments: ________________________________________________________________________________________________________________________________________________      Physician's Name: ___________________________________

## 2018-06-20 ENCOUNTER — OFFICE VISIT (OUTPATIENT)
Dept: OPTOMETRY | Facility: CLINIC | Age: 58
End: 2018-06-20
Payer: COMMERCIAL

## 2018-06-20 ENCOUNTER — OFFICE VISIT (OUTPATIENT)
Dept: FAMILY MEDICINE | Facility: CLINIC | Age: 58
End: 2018-06-20
Payer: COMMERCIAL

## 2018-06-20 VITALS
WEIGHT: 178.38 LBS | RESPIRATION RATE: 18 BRPM | BODY MASS INDEX: 24.97 KG/M2 | OXYGEN SATURATION: 96 % | HEART RATE: 69 BPM | SYSTOLIC BLOOD PRESSURE: 112 MMHG | HEIGHT: 71 IN | DIASTOLIC BLOOD PRESSURE: 80 MMHG

## 2018-06-20 DIAGNOSIS — E11.9 DIABETES MELLITUS TYPE 2 WITHOUT RETINOPATHY: Primary | ICD-10-CM

## 2018-06-20 DIAGNOSIS — H52.4 HYPEROPIA WITH PRESBYOPIA OF BOTH EYES: ICD-10-CM

## 2018-06-20 DIAGNOSIS — I10 HYPERTENSION, UNSPECIFIED TYPE: ICD-10-CM

## 2018-06-20 DIAGNOSIS — Z13.5 GLAUCOMA SCREENING: ICD-10-CM

## 2018-06-20 DIAGNOSIS — F41.9 ANXIETY: ICD-10-CM

## 2018-06-20 DIAGNOSIS — H43.393 VITREOUS FLOATERS, BILATERAL: ICD-10-CM

## 2018-06-20 DIAGNOSIS — H52.03 HYPEROPIA WITH PRESBYOPIA OF BOTH EYES: ICD-10-CM

## 2018-06-20 DIAGNOSIS — E78.5 HYPERLIPIDEMIA, UNSPECIFIED HYPERLIPIDEMIA TYPE: ICD-10-CM

## 2018-06-20 DIAGNOSIS — M50.30 DDD (DEGENERATIVE DISC DISEASE), CERVICAL: ICD-10-CM

## 2018-06-20 PROCEDURE — 99999 PR PBB SHADOW E&M-EST. PATIENT-LVL III: CPT | Mod: PBBFAC,,, | Performed by: OPTOMETRIST

## 2018-06-20 PROCEDURE — 92004 COMPRE OPH EXAM NEW PT 1/>: CPT | Mod: S$GLB,,, | Performed by: OPTOMETRIST

## 2018-06-20 PROCEDURE — 3074F SYST BP LT 130 MM HG: CPT | Mod: CPTII,S$GLB,, | Performed by: FAMILY MEDICINE

## 2018-06-20 PROCEDURE — 99999 PR PBB SHADOW E&M-EST. PATIENT-LVL III: CPT | Mod: PBBFAC,,, | Performed by: FAMILY MEDICINE

## 2018-06-20 PROCEDURE — 99214 OFFICE O/P EST MOD 30 MIN: CPT | Mod: S$GLB,,, | Performed by: FAMILY MEDICINE

## 2018-06-20 PROCEDURE — 3045F PR MOST RECENT HEMOGLOBIN A1C LEVEL 7.0-9.0%: CPT | Mod: CPTII,S$GLB,, | Performed by: FAMILY MEDICINE

## 2018-06-20 PROCEDURE — 3079F DIAST BP 80-89 MM HG: CPT | Mod: CPTII,S$GLB,, | Performed by: FAMILY MEDICINE

## 2018-06-20 PROCEDURE — 3008F BODY MASS INDEX DOCD: CPT | Mod: CPTII,S$GLB,, | Performed by: FAMILY MEDICINE

## 2018-06-20 PROCEDURE — 92015 DETERMINE REFRACTIVE STATE: CPT | Mod: S$GLB,,, | Performed by: OPTOMETRIST

## 2018-06-20 NOTE — PROGRESS NOTES
HPI     Annual Exam    Additional comments: DLE 12-14 (vito)   ocular health exam            Diabetic Eye Exam    Additional comments: BSL high, uncontrolled           Blurred Vision    Additional comments: at both near & distance           Spots and/or Floaters    Additional comments: OU -- no light flashes           Comments   Hemoglobin A1C       Date                     Value               Ref Range             Status                05/03/2018               8.8 (H)             4.0 - 5.6 %           Final              Comment:    According to ADA guidelines, hemoglobin A1c <7.0% represents  optimal   control in non-pregnant diabetic patients. Different  metrics may apply to   specific patient populations.   Standards of Medical Care in   Diabetes-2016.  For the purpose of screening for the presence of   diabetes:  <5.7%     Consistent with the absence of diabetes  5.7-6.4%    Consistent with increasing risk for diabetes   (prediabetes)  >or=6.5%    Consistent with diabetes  Currently, no consensus exists for use of   hemoglobin A1c  for diagnosis of diabetes for children.  This Hemoglobin   A1c assay has significant interference with fetal   hemoglobin   (HbF).   The results are invalid for patients with abnormal amounts of   HbF,     including those with known Hereditary Persistence   of Fetal Hemoglobin.   Heterozygous hemoglobin variants (HbAS, HbAC,   HbAD, HbAE, HbA2) do not   significantly interfere with this assay;   however, presence of multiple   variants in a sample may impact the %   interference.         07/14/2016               6.5 (H)             4.5 - 6.2 %           Final              Comment:    According to ADA guidelines, hemoglobin A1C <7.0% represents  optimal   control in non-pregnant diabetic patients.  Different  metrics may apply   to specific populations.   Standards of Medical Care in Diabetes -   2016.  For the purpose of screening for the presence of diabetes:  <5.7%        Consistent with the absence of diabetes  5.7-6.4%  Consistent with   increasing risk for diabetes   (prediabetes)  >or=6.5%  Consistent with   diabetes  Currently no consensus exists for use of hemoglobin A1C  for   diagnosis of diabetes for children.         07/15/2014               6.6 (H)             4.5 - 6.2 %           Final            ----------    Glucose moderate / poor  BP reported doing well  Notes reduced at distance / near         Last edited by MAI Myers, OD on 6/20/2018  8:19 AM. (History)        ROS     Positive for: Eyes    Negative for: Constitutional, Gastrointestinal, Neurological, Skin,   Genitourinary, Musculoskeletal, HENT, Endocrine, Cardiovascular,   Respiratory, Psychiatric, Allergic/Imm, Heme/Lymph    Last edited by MAI Myers, OD on 6/20/2018  8:19 AM. (History)        Assessment /Plan     For exam results, see Encounter Report.    Diabetes mellitus type 2 without retinopathy    Vitreous floaters, bilateral    Glaucoma screening    Hyperopia with presbyopia of both eyes      1. No ret/ no csme, gave info, control glucose, annual DFE  2. RD precautions given  3. Not suspect  4. Updated specs rx, gave copy, fill prn    Discussed and educated patient on current findings /plan.  RTC 1 year, prn if any changes / issues

## 2018-06-20 NOTE — PROGRESS NOTES
Subjective:       Patient ID: Austin Smith is a 58 y.o. male.    Chief Complaint: Follow-up    Here today to f/u on chronic health issues    He previously reported being irritable, edgy, anxious, coates, decreased focus, frustration due to his progressive neck pain.  Tolerating Lexapro 10mg daily for the past month with good mood control.    Cervical DDD, spinal stenosis - neurosurgeon appointment pending  Diabetes - following a diabetic diet; tolerating metformin XR 750mg daily  HLD - tolerating Crestor 20mg daily  HTN - tolerating lisinopril HCT 20/25            Past Medical History:   Diagnosis Date    Anticoagulant long-term use     ASA 81mg    Arthritis     Cataract     OU    DM type 2 (diabetes mellitus, type 2)     GERD (gastroesophageal reflux disease)     Hyperlipidemia     Hypertension     Iris nevus     OS    Lattice degeneration     Migraine syndrome     Neck pain     Wears glasses        Past Surgical History:   Procedure Laterality Date    Epidural Steroid injection      Pain Management    HERNIA REPAIR Left     inguinal as child    ROTATOR CUFF REPAIR      left       Review of patient's allergies indicates:   Allergen Reactions    No known allergies        Social History     Social History    Marital status:      Spouse name: N/A    Number of children: 2    Years of education: N/A     Occupational History    retired      Social History Main Topics    Smoking status: Current Every Day Smoker     Packs/day: 1.00     Types: Cigarettes     Start date: 8/19/2002    Smokeless tobacco: Never Used    Alcohol use Yes      Comment: few beers per week    Drug use: No    Sexual activity: Not on file     Other Topics Concern    Not on file     Social History Narrative    No narrative on file       Current Outpatient Prescriptions on File Prior to Visit   Medication Sig Dispense Refill    escitalopram oxalate (LEXAPRO) 10 MG tablet Take 1 tablet (10 mg total) by mouth once  "daily. 30 tablet 11    lisinopril-hydrochlorothiazide (PRINZIDE,ZESTORETIC) 20-25 mg Tab Take 1 tablet by mouth once daily. 30 tablet 11    metFORMIN (GLUCOPHAGE-XR) 750 MG 24 hr tablet Take 1 tablet (750 mg total) by mouth 2 (two) times daily with meals. 60 tablet 11    rosuvastatin (CRESTOR) 20 MG tablet Take 1 tablet (20 mg total) by mouth once daily. 30 tablet 11     No current facility-administered medications on file prior to visit.        No family history on file.    Review of Systems   Constitutional: Negative for appetite change, chills, fever and unexpected weight change.   HENT: Negative for sore throat and trouble swallowing.    Eyes: Negative for pain and visual disturbance.   Respiratory: Negative for cough, chest tightness, shortness of breath and wheezing.    Cardiovascular: Negative for chest pain, palpitations and leg swelling.   Gastrointestinal: Negative for abdominal pain, blood in stool, constipation, diarrhea and nausea.   Genitourinary: Negative for difficulty urinating, dysuria and hematuria.   Musculoskeletal: Positive for neck pain. Negative for arthralgias and gait problem.   Skin: Negative for rash and wound.   Neurological: Negative for dizziness, weakness, light-headedness and headaches.   Hematological: Negative for adenopathy.   Psychiatric/Behavioral: Negative for dysphoric mood. The patient is nervous/anxious.        Objective:      /80   Pulse 69   Resp 18   Ht 5' 11" (1.803 m)   Wt 80.9 kg (178 lb 5.6 oz)   SpO2 96%   BMI 24.88 kg/m²   Physical Exam   Constitutional: He is oriented to person, place, and time. He appears well-developed and well-nourished. He is active. No distress.   HENT:   Head: Normocephalic and atraumatic.   Right Ear: External ear normal.   Left Ear: External ear normal.   Mouth/Throat: Uvula is midline, oropharynx is clear and moist and mucous membranes are normal. No oropharyngeal exudate.   Eyes: Conjunctivae, EOM and lids are normal. " Pupils are equal, round, and reactive to light.   Neck: Normal range of motion, full passive range of motion without pain and phonation normal. Neck supple. No thyroid mass and no thyromegaly present.   Cardiovascular: Normal rate, regular rhythm, normal heart sounds and intact distal pulses.  Exam reveals no gallop and no friction rub.    No murmur heard.  Pulmonary/Chest: Effort normal and breath sounds normal. No respiratory distress. He has no wheezes. He has no rales.   Musculoskeletal: Normal range of motion.   Lymphadenopathy:     He has no cervical adenopathy.   Neurological: He is alert and oriented to person, place, and time. No cranial nerve deficit. Coordination normal.   Skin: Skin is warm and dry.   Psychiatric: He has a normal mood and affect. His speech is normal and behavior is normal. Judgment and thought content normal.       Results for orders placed or performed in visit on 05/03/18   Microalbumin/creatinine urine ratio   Result Value Ref Range    Microalbum.,U,Random 64.0 ug/mL    Creatinine, Random Ur 170.0 23.0 - 375.0 mg/dL    Microalb Creat Ratio 37.6 (H) 0.0 - 30.0 ug/mg       Labs 5/3/18 reviewed    Assessment:       1. Uncontrolled type 2 diabetes mellitus with microalbuminuria, without long-term current use of insulin    2. Hypertension, unspecified type    3. Hyperlipidemia, unspecified hyperlipidemia type    4. DDD (degenerative disc disease), cervical    5. Anxiety        Plan:       Uncontrolled type 2 diabetes mellitus with microalbuminuria, without long-term current use of insulin  -     Comprehensive metabolic panel; Future; Expected date: 09/18/2018  -     Hemoglobin A1c; Future; Expected date: 09/18/2018  -     Lipid panel; Future; Expected date: 09/18/2018    Hypertension, unspecified type    Hyperlipidemia, unspecified hyperlipidemia type    DDD (degenerative disc disease), cervical    Anxiety        Continue Lexapro  Continue other meds  Continue PT; f/u with neurosurgeon as  planned  He is considering quitting smoking and is trying to cut back  Counseled on regular exercise, maintenance of a healthy weight, balanced diet rich in fruits/vegetables and lean protein, and avoidance of unhealthy habits like smoking and excessive alcohol intake.

## 2018-06-20 NOTE — LETTER
June 20, 2018      Suleman Jernigan MD  [address] [facility] [address] [place] [address]           [facility]  [address] [facility] [address] [place] [address]  Phone: [phone]  Fax: [phone]          Patient: Austin Smith   MR Number: [id number]   YOB: 1960   Date of Visit: 6/20/2018       Dear Dr. Suleman Jernigan:    Thank you for referring Austin Smith to me for evaluation. Attached you will find relevant portions of my assessment and plan of care.    If you have questions, please do not hesitate to call me. I look forward to following Austin Simth along with you.    Sincerely,    MAI Myers, OD    Enclosure  CC:  No Recipients    If you would like to receive this communication electronically, please contact [email or web address] or [phone] to request more information on [employer] [facility] access.    For providers and/or their staff who would like to refer a patient to [facility], please contact us through our one-stop-shop provider referral line, [facility], at [phone].    If you feel you have received this communication in error or would no longer like to receive these types of communications, please e-mail [email or web address]

## 2018-06-22 ENCOUNTER — CLINICAL SUPPORT (OUTPATIENT)
Dept: REHABILITATION | Facility: HOSPITAL | Age: 58
End: 2018-06-22
Attending: NEUROLOGICAL SURGERY
Payer: COMMERCIAL

## 2018-06-22 DIAGNOSIS — Z11.59 NEED FOR HEPATITIS C SCREENING TEST: ICD-10-CM

## 2018-06-22 DIAGNOSIS — M50.30 DDD (DEGENERATIVE DISC DISEASE), CERVICAL: Primary | ICD-10-CM

## 2018-06-22 DIAGNOSIS — Z12.11 COLON CANCER SCREENING: ICD-10-CM

## 2018-06-22 PROCEDURE — 97110 THERAPEUTIC EXERCISES: CPT | Mod: PN

## 2018-06-22 PROCEDURE — 97140 MANUAL THERAPY 1/> REGIONS: CPT | Mod: PN

## 2018-06-22 NOTE — PROGRESS NOTES
"Name: Austin Lozada [facility] Number: [id number]  Date of Treatment: 06/22/2018   Diagnosis:   Encounter Diagnosis   Name Primary?    DDD (degenerative disc disease), cervical Yes       Time in: 0702  Time Out: 0750  Total Treatment Time: 48        Subjective:    Austin reports "I'm always in pain".  Patient reports their pain to be 6/10 on a 0-10 scale with 0 being no pain and 10 being the worst pain imaginable.    Objective    Patient received individual therapy to increase strength, endurance, ROM, flexibility and posture with 0 patients with activities as follows:     Austin received therapeutic exercises to develop strength, endurance, ROM, flexibility and posture for 33 minutes including:       UBE 3'/3'  Cervical ROM flex/ext, rotation, side bending x 15 reps  Chin tucks x 15 reps  Upper trap stretch 3 x 15 sec  Levator stretch 3 x15  sec  Shoulder rolls x 15 reps   scap retraction x 15 reps  Shoulder shrugs x 15 reps    The patient received the following supervised modalities after being cleared for contradictions: Mechanical Traction:  Austin received intermittent mechanical traction to the cervical spine at a force of 17 pounds for a total of 15 minutes. Hold time of 30 sec  and rest time for 10 sec. Patient tolerated treatment well without any adverse effects.    Pt demo good understanding of the education provided. Austin demonstrated good return demonstration of activities.     Assessment:   Decreased soreness in cervical area following cervical traction    Pt will continue to benefit from skilled PT intervention. Medical Necessity is demonstrated by:  Pain limits function of effected part for some activities, Unable to participate fully in daily activities, Requires skilled supervision to complete and progress HEP and Weakness.    Patient is making good progress towards established goals.          Plan:  Continue with established Plan of Care towards PT goals.   "

## 2018-06-26 ENCOUNTER — CLINICAL SUPPORT (OUTPATIENT)
Dept: REHABILITATION | Facility: HOSPITAL | Age: 58
End: 2018-06-26
Attending: NEUROLOGICAL SURGERY
Payer: COMMERCIAL

## 2018-06-26 DIAGNOSIS — M50.30 DDD (DEGENERATIVE DISC DISEASE), CERVICAL: ICD-10-CM

## 2018-06-26 PROCEDURE — 97110 THERAPEUTIC EXERCISES: CPT | Mod: PN

## 2018-06-26 PROCEDURE — 97010 HOT OR COLD PACKS THERAPY: CPT | Mod: PN

## 2018-06-26 PROCEDURE — 97012 MECHANICAL TRACTION THERAPY: CPT | Mod: PN

## 2018-06-26 NOTE — PROGRESS NOTES
"Name: Austin Lozada [facility] Number: [id number]  Date of Treatment: 06/26/2018   Diagnosis:   Encounter Diagnosis   Name Primary?    DDD (degenerative disc disease), cervical        Time in: 0700  Time Out: 0805  Total Treatment Time: 65        Subjective:    Austin reports increased pain.  Patient reports their pain to be 8/10 on a 0-10 scale with 0 being no pain and 10 being the worst pain imaginable.    Objective    Patient received individual therapy to increase strength, endurance, ROM, flexibility and posture with 0 patients with activities as follows:     Austin received therapeutic exercises to develop strength, endurance, ROM, flexibility and posture for 50 minutes including:       UBE 4'/4'  Cervical ROM flex/ext, rotation, side bending x 15 reps  Chin tucks x 15 reps  Chin tucks x 15 reps  Upper trap stretch 3 x 15 sec  Levator stretch 3 x15  sec  Shoulder rolls x 15 reps   scap retraction x 15 reps  Shoulder shrugs x 15 reps      The patient received the following supervised modalities after being cleared for contradictions: Mechanical Traction:  Austin received intermittent mechanical traction to the cervical spine at a force of 17 pounds for a total of 15 minutes. Hold time of 30 sec  and rest time for 10 sec. Patient tolerated treatment well without any adverse effects.    Pt demo good understanding of the education provided. Austin demonstrated good return demonstration of activities.     Assessment:   Pt reports decreased pain following mechanical traction.  "I need to get one of those for home".    Pt will continue to benefit from skilled PT intervention. Medical Necessity is demonstrated by:  Pain limits function of effected part for some activities, Unable to participate fully in daily activities, Requires skilled supervision to complete and progress HEP and Weakness.    Patient is making good progress towards established goals.          Plan:  Continue with established Plan of Care towards PT " goals.

## 2018-06-29 ENCOUNTER — CLINICAL SUPPORT (OUTPATIENT)
Dept: REHABILITATION | Facility: HOSPITAL | Age: 58
End: 2018-06-29
Attending: NEUROLOGICAL SURGERY
Payer: COMMERCIAL

## 2018-06-29 DIAGNOSIS — M50.30 DDD (DEGENERATIVE DISC DISEASE), CERVICAL: ICD-10-CM

## 2018-06-29 PROCEDURE — 97110 THERAPEUTIC EXERCISES: CPT | Mod: PN | Performed by: PHYSICAL THERAPIST

## 2018-06-29 PROCEDURE — 97012 MECHANICAL TRACTION THERAPY: CPT | Mod: PN | Performed by: PHYSICAL THERAPIST

## 2018-06-29 NOTE — PATIENT INSTRUCTIONS
Strengthening: Resisted Extension               STOP AT YOUR THIGH        Hold tubing in right hand, arm forward. Pull arm back, elbow straight.  Repeat __10__ times per set. Do ____ sets per session. Do __1__ sessions per day.     https://Letao.Evergig.WorldPassKey/832     Copyright © [employer]. All rights reserved.   Strengthening: Resisted Adduction              STOP AT YOUR THIGH        Hold tubing in left hand, arm out. Pull arm toward opposite hip. Do not twist or rotate trunk.  Repeat __10__ times per set. Do __3__ sets per session. Do __1__ sessions per day.     https://Letao.Evergig.[email or web address]     Copyright © [employer]. All rights reserved.           ROWS AT 45* 3 SETS OF 10

## 2018-06-29 NOTE — PROGRESS NOTES
Name: Austin Lozada [facility] Number: [id number]  Date of Treatment: 06/29/2018   Diagnosis:   Encounter Diagnosis   Name Primary?    DDD (degenerative disc disease), cervical        Time in: 1000  Time Out: 1100  Total Treatment Time: 60  Group Time: 0      Subjective:    Austin reports his pain level is about the same.  Patient reports their pain to be 6/10 on a 0-10 scale with 0 being no pain and 10 being the worst pain imaginable.    Objective    Patient received individual therapy to increase strength, endurance, ROM and posture with 0 patients with activities as follows:     Austin received therapeutic exercises to develop strength, endurance, ROM and posture for 46 minutes including:     CROM in all planes x 30  Chin tucks 3 x 10  Upper trap stretch 10 x 10 sec  Shoulder roll 3 x 10  Shoulder shrugs 3 x 10  Scapula retractions 3 x 10  Anterior chest wall stretch 10 x 10 sec  Pulleys shoulder extension 3 x 10 25#  Pulleys shoulder adduction 3 x 10 25#  Pulleys rows 3 x 10 25#    Patient received intermittent cervical traction x 15 min at 18# max/ 7# min, 30 sec on/ 10 sec off using 3 steps      Assessment:       Pt will continue to benefit from skilled PT intervention. Medical Necessity is demonstrated by:  Requires skilled supervision to complete and progress HEP.    Patient is making fair progress towards established goals.    New/Revised goals: na      Plan:  Continue with established Plan of Care towards PT goals.

## 2018-07-02 ENCOUNTER — CLINICAL SUPPORT (OUTPATIENT)
Dept: REHABILITATION | Facility: HOSPITAL | Age: 58
End: 2018-07-02
Attending: NEUROLOGICAL SURGERY
Payer: COMMERCIAL

## 2018-07-02 DIAGNOSIS — M54.2 NECK PAIN: Primary | ICD-10-CM

## 2018-07-02 PROCEDURE — 97110 THERAPEUTIC EXERCISES: CPT | Mod: PN

## 2018-07-02 PROCEDURE — 97140 MANUAL THERAPY 1/> REGIONS: CPT | Mod: PN

## 2018-07-02 NOTE — PROGRESS NOTES
TIME RECORD    Date:  07/02/2018    Start Time:  0702  Stop Time:  0750    PROCEDURES:    TIMED  Procedure Time Min.   TE Start:0702  Stop:0712 10   MTT/DN Start:0715  Stop:0745 30    Start:  Stop:     Start:  Stop:          UNTIMED  Procedure Time Min.    Start:  Stop:     Start:  Stop:      Total Timed Minutes:  40  Total Timed Units:  3  Total Untimed Units:    Charges Billed/# of units:  3      Progress/Current Status    Subjective:     Patient ID: Austin Smith is a 58 y.o. male.  Diagnosis:   1. Neck pain       Pain: 6 /10  Pt wants to try dry needling.    Objective:     UBE X 10' F/B dry needling to C3-T1 multifidus,upper traps with IMS X 10', HAs 3,7,8.     Assessment:     Tolerated DN very well.    Patient Education/Response:     Expect to be sore tonight.    Plans and Goals:     Cont per POC.

## 2018-09-06 ENCOUNTER — PATIENT OUTREACH (OUTPATIENT)
Dept: ADMINISTRATIVE | Facility: HOSPITAL | Age: 58
End: 2018-09-06

## 2018-09-06 NOTE — PROGRESS NOTES
Health Maintenance Due   Topic Date Due    Hepatitis C Screening  1960    Pneumococcal PPSV23 (Medium Risk) (1) 03/17/1980    Colonoscopy  03/17/2012    Influenza Vaccine  08/01/2018    Hemoglobin A1c  08/03/2018

## 2018-09-13 ENCOUNTER — LAB VISIT (OUTPATIENT)
Dept: LAB | Facility: HOSPITAL | Age: 58
End: 2018-09-13
Attending: FAMILY MEDICINE
Payer: COMMERCIAL

## 2018-09-13 ENCOUNTER — TELEPHONE (OUTPATIENT)
Dept: FAMILY MEDICINE | Facility: CLINIC | Age: 58
End: 2018-09-13

## 2018-09-13 DIAGNOSIS — Z11.59 NEED FOR HEPATITIS C SCREENING TEST: ICD-10-CM

## 2018-09-13 LAB
ALBUMIN SERPL BCP-MCNC: 3.9 G/DL
ALBUMIN/CREAT UR: ABNORMAL UG/MG
ALP SERPL-CCNC: 90 U/L
ALT SERPL W/O P-5'-P-CCNC: 30 U/L
ANION GAP SERPL CALC-SCNC: 8 MMOL/L
AST SERPL-CCNC: 18 U/L
BILIRUB SERPL-MCNC: 0.4 MG/DL
BUN SERPL-MCNC: 6 MG/DL
CALCIUM SERPL-MCNC: 9.5 MG/DL
CHLORIDE SERPL-SCNC: 101 MMOL/L
CHOLEST SERPL-MCNC: 113 MG/DL
CHOLEST/HDLC SERPL: 2.8 {RATIO}
CO2 SERPL-SCNC: 29 MMOL/L
CREAT SERPL-MCNC: 0.8 MG/DL
CREAT UR-MCNC: 19 MG/DL
EST. GFR  (AFRICAN AMERICAN): >60 ML/MIN/1.73 M^2
EST. GFR  (NON AFRICAN AMERICAN): >60 ML/MIN/1.73 M^2
ESTIMATED AVG GLUCOSE: 157 MG/DL
GLUCOSE SERPL-MCNC: 192 MG/DL
HBA1C MFR BLD HPLC: 7.1 %
HDLC SERPL-MCNC: 41 MG/DL
HDLC SERPL: 36.3 %
LDLC SERPL CALC-MCNC: 57 MG/DL
MICROALBUMIN UR DL<=1MG/L-MCNC: <2.5 UG/ML
NONHDLC SERPL-MCNC: 72 MG/DL
POTASSIUM SERPL-SCNC: 4 MMOL/L
PROT SERPL-MCNC: 7 G/DL
SODIUM SERPL-SCNC: 138 MMOL/L
TRIGL SERPL-MCNC: 75 MG/DL

## 2018-09-13 PROCEDURE — 82043 UR ALBUMIN QUANTITATIVE: CPT

## 2018-09-13 PROCEDURE — 36415 COLL VENOUS BLD VENIPUNCTURE: CPT | Mod: PO

## 2018-09-13 PROCEDURE — 86803 HEPATITIS C AB TEST: CPT

## 2018-09-13 PROCEDURE — 83036 HEMOGLOBIN GLYCOSYLATED A1C: CPT

## 2018-09-13 PROCEDURE — 80061 LIPID PANEL: CPT

## 2018-09-13 PROCEDURE — 80053 COMPREHEN METABOLIC PANEL: CPT

## 2018-09-13 NOTE — TELEPHONE ENCOUNTER
Lab called and states that patient dropped off a urine when he did labs in but no orders. I am pending what I think

## 2018-09-14 LAB — HCV AB SERPL QL IA: NEGATIVE

## 2018-10-09 ENCOUNTER — OFFICE VISIT (OUTPATIENT)
Dept: FAMILY MEDICINE | Facility: CLINIC | Age: 58
End: 2018-10-09
Payer: COMMERCIAL

## 2018-10-09 VITALS
SYSTOLIC BLOOD PRESSURE: 138 MMHG | HEART RATE: 72 BPM | OXYGEN SATURATION: 98 % | TEMPERATURE: 98 F | BODY MASS INDEX: 24.72 KG/M2 | DIASTOLIC BLOOD PRESSURE: 88 MMHG | RESPIRATION RATE: 18 BRPM | WEIGHT: 176.56 LBS | HEIGHT: 71 IN

## 2018-10-09 DIAGNOSIS — Z72.0 TOBACCO ABUSE: ICD-10-CM

## 2018-10-09 DIAGNOSIS — J06.9 UPPER RESPIRATORY TRACT INFECTION, UNSPECIFIED TYPE: ICD-10-CM

## 2018-10-09 DIAGNOSIS — I10 HYPERTENSION, UNSPECIFIED TYPE: ICD-10-CM

## 2018-10-09 DIAGNOSIS — R05.9 COUGH: ICD-10-CM

## 2018-10-09 DIAGNOSIS — J01.00 ACUTE NON-RECURRENT MAXILLARY SINUSITIS: Primary | ICD-10-CM

## 2018-10-09 DIAGNOSIS — E11.65 UNCONTROLLED TYPE 2 DIABETES MELLITUS WITH HYPERGLYCEMIA: ICD-10-CM

## 2018-10-09 DIAGNOSIS — R06.2 WHEEZING: ICD-10-CM

## 2018-10-09 PROCEDURE — 99999 PR PBB SHADOW E&M-EST. PATIENT-LVL IV: CPT | Mod: PBBFAC,,, | Performed by: NURSE PRACTITIONER

## 2018-10-09 PROCEDURE — 99214 OFFICE O/P EST MOD 30 MIN: CPT | Mod: S$GLB,,, | Performed by: NURSE PRACTITIONER

## 2018-10-09 PROCEDURE — 3008F BODY MASS INDEX DOCD: CPT | Mod: CPTII,S$GLB,, | Performed by: NURSE PRACTITIONER

## 2018-10-09 PROCEDURE — 3075F SYST BP GE 130 - 139MM HG: CPT | Mod: CPTII,S$GLB,, | Performed by: NURSE PRACTITIONER

## 2018-10-09 PROCEDURE — 3079F DIAST BP 80-89 MM HG: CPT | Mod: CPTII,S$GLB,, | Performed by: NURSE PRACTITIONER

## 2018-10-09 PROCEDURE — 3045F PR MOST RECENT HEMOGLOBIN A1C LEVEL 7.0-9.0%: CPT | Mod: CPTII,S$GLB,, | Performed by: NURSE PRACTITIONER

## 2018-10-09 RX ORDER — CEFUROXIME AXETIL 500 MG/1
500 TABLET ORAL EVERY 12 HOURS
Qty: 14 TABLET | Refills: 0 | Status: SHIPPED | OUTPATIENT
Start: 2018-10-09 | End: 2018-11-12

## 2018-10-09 RX ORDER — PREDNISONE 20 MG/1
TABLET ORAL
Qty: 9 TABLET | Refills: 0 | Status: SHIPPED | OUTPATIENT
Start: 2018-10-09 | End: 2018-11-12

## 2018-10-09 NOTE — PROGRESS NOTES
Subjective:       Patient ID: Austin Smith is a 58 y.o. male.    Chief Complaint: Cough; Chest Congestion; and Headache    Here today with cough and congestion    He is patient of Dr Jernigan and this is the first time I am seeing him in clinic       Cough   This is a new problem. The current episode started 1 to 4 weeks ago. The problem has been gradually worsening. The problem occurs every few hours. The cough is productive of sputum and productive of purulent sputum. Associated symptoms include headaches, nasal congestion, postnasal drip, rhinorrhea, a sore throat and wheezing. Pertinent negatives include no chest pain, chills, ear congestion, ear pain, fever, heartburn, hemoptysis, myalgias, rash, shortness of breath, sweats or weight loss. Risk factors for lung disease include smoking/tobacco exposure. Treatments tried: robitussin.     Vitals:    10/09/18 0727   BP: 138/88   Pulse: 72   Resp: 18   Temp: 98.1 °F (36.7 °C)     Review of Systems   Constitutional: Negative.  Negative for chills, fatigue, fever and weight loss.   HENT: Positive for congestion, postnasal drip, rhinorrhea, sinus pressure, sinus pain and sore throat. Negative for ear pain.    Eyes: Negative.    Respiratory: Positive for cough and wheezing. Negative for hemoptysis and shortness of breath.    Cardiovascular: Negative.  Negative for chest pain.   Gastrointestinal: Negative.  Negative for abdominal pain, diarrhea, heartburn and nausea.   Endocrine: Negative.    Genitourinary: Negative.  Negative for dysuria and hematuria.   Musculoskeletal: Negative.  Negative for myalgias.   Skin: Negative for color change and rash.   Allergic/Immunologic: Negative.    Neurological: Positive for headaches. Negative for numbness.   Hematological: Negative.        Past Medical History:   Diagnosis Date    Anticoagulant long-term use     ASA 81mg    Arthritis     Cataract     OU    DM type 2 (diabetes mellitus, type 2)     GERD (gastroesophageal  reflux disease)     Hyperlipidemia     Hypertension     Iris nevus     OS    Lattice degeneration     Migraine syndrome     Neck pain     Wears glasses      Objective:      Physical Exam   Constitutional: He is oriented to person, place, and time. He appears well-developed and well-nourished.   HENT:   Head: Normocephalic and atraumatic.   Right Ear: Ear canal normal.   Left Ear: Ear canal normal.   Nose: Mucosal edema present. Right sinus exhibits maxillary sinus tenderness. Right sinus exhibits no frontal sinus tenderness. Left sinus exhibits maxillary sinus tenderness. Left sinus exhibits no frontal sinus tenderness.   Mouth/Throat: Uvula is midline and mucous membranes are normal. Posterior oropharyngeal erythema present.   Eyes: Conjunctivae and EOM are normal. Pupils are equal, round, and reactive to light.   Neck: Normal range of motion. Neck supple.   Cardiovascular: Normal rate, regular rhythm, normal heart sounds and intact distal pulses.   Pulmonary/Chest: Effort normal. He has rhonchi.   Abdominal: Soft. Bowel sounds are normal.   Musculoskeletal: Normal range of motion.   Neurological: He is alert and oriented to person, place, and time.   Skin: Skin is warm and dry.   Psychiatric: He has a normal mood and affect. His behavior is normal.   Nursing note and vitals reviewed.      Assessment:       1. Acute non-recurrent maxillary sinusitis    2. Upper respiratory tract infection, unspecified type    3. Uncontrolled type 2 diabetes mellitus with hyperglycemia    4. Hypertension, unspecified type    5. Cough    6. Wheezing    7. Tobacco abuse        Plan:       Acute non-recurrent maxillary sinusitis  -     cefUROXime (CEFTIN) 500 MG tablet; Take 1 tablet (500 mg total) by mouth every 12 (twelve) hours.  Dispense: 14 tablet; Refill: 0    Upper respiratory tract infection, unspecified type  Use antihistamine   And cough meds OTC     Uncontrolled type 2 diabetes mellitus with hyperglycemia  Stable on  meds     Hypertension, unspecified type  Stable - taking meds     Cough  -     predniSONE (DELTASONE) 20 MG tablet; Take 2 tablets for 3 days then 1 tablet for 3 days  Dispense: 9 tablet; Refill: 0    Wheezing  -     predniSONE (DELTASONE) 20 MG tablet; Take 2 tablets for 3 days then 1 tablet for 3 days  Dispense: 9 tablet; Refill: 0    Smoking - discussed cessation       discussed if any issues or increased cough - call back

## 2018-11-12 ENCOUNTER — OFFICE VISIT (OUTPATIENT)
Dept: FAMILY MEDICINE | Facility: CLINIC | Age: 58
End: 2018-11-12
Payer: COMMERCIAL

## 2018-11-12 VITALS
RESPIRATION RATE: 18 BRPM | BODY MASS INDEX: 24.66 KG/M2 | WEIGHT: 176.13 LBS | HEIGHT: 71 IN | OXYGEN SATURATION: 96 % | SYSTOLIC BLOOD PRESSURE: 110 MMHG | HEART RATE: 78 BPM | DIASTOLIC BLOOD PRESSURE: 70 MMHG

## 2018-11-12 DIAGNOSIS — R53.83 FATIGUE, UNSPECIFIED TYPE: ICD-10-CM

## 2018-11-12 DIAGNOSIS — E11.9 CONTROLLED TYPE 2 DIABETES MELLITUS WITHOUT COMPLICATION, WITHOUT LONG-TERM CURRENT USE OF INSULIN: Primary | ICD-10-CM

## 2018-11-12 DIAGNOSIS — E78.5 HYPERLIPIDEMIA, UNSPECIFIED HYPERLIPIDEMIA TYPE: ICD-10-CM

## 2018-11-12 DIAGNOSIS — I10 HYPERTENSION, UNSPECIFIED TYPE: ICD-10-CM

## 2018-11-12 PROCEDURE — 3045F PR MOST RECENT HEMOGLOBIN A1C LEVEL 7.0-9.0%: CPT | Mod: CPTII,S$GLB,, | Performed by: FAMILY MEDICINE

## 2018-11-12 PROCEDURE — 99999 PR PBB SHADOW E&M-EST. PATIENT-LVL III: CPT | Mod: PBBFAC,,, | Performed by: FAMILY MEDICINE

## 2018-11-12 PROCEDURE — 3078F DIAST BP <80 MM HG: CPT | Mod: CPTII,S$GLB,, | Performed by: FAMILY MEDICINE

## 2018-11-12 PROCEDURE — 90686 IIV4 VACC NO PRSV 0.5 ML IM: CPT | Mod: S$GLB,,, | Performed by: FAMILY MEDICINE

## 2018-11-12 PROCEDURE — 99213 OFFICE O/P EST LOW 20 MIN: CPT | Mod: 25,S$GLB,, | Performed by: FAMILY MEDICINE

## 2018-11-12 PROCEDURE — 90471 IMMUNIZATION ADMIN: CPT | Mod: S$GLB,,, | Performed by: FAMILY MEDICINE

## 2018-11-12 PROCEDURE — 3008F BODY MASS INDEX DOCD: CPT | Mod: CPTII,S$GLB,, | Performed by: FAMILY MEDICINE

## 2018-11-12 PROCEDURE — 3074F SYST BP LT 130 MM HG: CPT | Mod: CPTII,S$GLB,, | Performed by: FAMILY MEDICINE

## 2018-11-12 NOTE — PROGRESS NOTES
Subjective:       Patient ID: Austin Smith is a 58 y.o. male.    Chief Complaint: Fatigue (Flu shot) and Dizziness    Here to f/u on chronic issues.    C/o some dizziness when standing and occasionally when turning his head.  Also feeling generally lethargic with little energy.  sleeping well without about 8 hours of sleep.  Symptoms started after sinus infection    He previously reported being irritable, edgy, anxious, coates, decreased focus, frustration due to his progressive neck pain.  Tolerating Lexapro 10mg daily for the past 6 months with good mood control.  Cervical DDD, spinal stenosis - stable  Diabetes - following a diabetic diet; tolerating metformin XR 750mg BID  HLD - tolerating Crestor 20mg daily  HTN - tolerating lisinopril HCT 20/25            Past Medical History:   Diagnosis Date    Anticoagulant long-term use     ASA 81mg    Arthritis     Cataract     OU    DM type 2 (diabetes mellitus, type 2)     GERD (gastroesophageal reflux disease)     Hyperlipidemia     Hypertension     Iris nevus     OS    Lattice degeneration     Migraine syndrome     Neck pain     Wears glasses        Past Surgical History:   Procedure Laterality Date    Epidural Steroid injection      Pain Management    HERNIA REPAIR Left     inguinal as child    INJECTION-STEROID-EPIDURAL-CERVICAL N/A 8/27/2014    [facility]formed [facility] at [facility]    INJECTION-STEROID-EPIDURAL-CERVICAL N/A 7/22/2014    [facility] at [facility] OR    REPAIR-HAMMER TOE  Right 9/21/2016    [facility]    ROTATOR CUFF REPAIR      left       Review of patient's allergies indicates:   Allergen Reactions    No known allergies        Social History     Socioeconomic History    Marital status:      Spouse name: Not on file    Number of children: 2    Years of education: Not on file    Highest education level: Not on file   Social Needs    Financial resource  strain: Not on file    Food insecurity - worry: Not on file    Food insecurity - inability: Not on file    Transportation needs - medical: Not on file    Transportation needs - non-medical: Not on file   Occupational History    Occupation: retired   Tobacco Use    Smoking status: Current Every Day Smoker     Packs/day: 1.00     Types: Cigarettes     Start date: 8/19/2002    Smokeless tobacco: Never Used   Substance and Sexual Activity    Alcohol use: Yes     Comment: few beers per week    Drug use: No    Sexual activity: Not on file   Other Topics Concern    Not on file   Social History Narrative    Not on file       Current Outpatient Medications on File Prior to Visit   Medication Sig Dispense Refill    lisinopril-hydrochlorothiazide (PRINZIDE,ZESTORETIC) 20-25 mg Tab Take 1 tablet by mouth once daily. 30 tablet 11    metFORMIN (GLUCOPHAGE-XR) 750 MG 24 hr tablet Take 1 tablet (750 mg total) by mouth 2 (two) times daily with meals. 60 tablet 11    rosuvastatin (CRESTOR) 20 MG tablet Take 1 tablet (20 mg total) by mouth once daily. 30 tablet 11     No current facility-administered medications on file prior to visit.        No family history on file.    Review of Systems   Constitutional: Positive for fatigue. Negative for appetite change, chills, fever and unexpected weight change.   HENT: Negative for sore throat and trouble swallowing.    Eyes: Negative for pain and visual disturbance.   Respiratory: Negative for cough, chest tightness, shortness of breath and wheezing.    Cardiovascular: Negative for chest pain, palpitations and leg swelling.   Gastrointestinal: Negative for abdominal pain, blood in stool, constipation, diarrhea and nausea.   Genitourinary: Negative for difficulty urinating, dysuria and hematuria.   Musculoskeletal: Negative for arthralgias, gait problem and neck pain.   Skin: Negative for rash and wound.   Neurological: Positive for dizziness. Negative for weakness,  "light-headedness and headaches.   Hematological: Negative for adenopathy.   Psychiatric/Behavioral: Negative for dysphoric mood.       Objective:      /70   Pulse 78   Resp 18   Ht 5' 11" (1.803 m)   Wt 79.9 kg (176 lb 2.4 oz)   SpO2 96%   BMI 24.57 kg/m²   Physical Exam   Constitutional: He is oriented to person, place, and time. He appears well-developed and well-nourished. He is active. No distress.   HENT:   Head: Normocephalic and atraumatic.   Right Ear: External ear normal.   Left Ear: External ear normal.   Mouth/Throat: Uvula is midline, oropharynx is clear and moist and mucous membranes are normal. No oropharyngeal exudate.   Eyes: Conjunctivae, EOM and lids are normal. Pupils are equal, round, and reactive to light.   Neck: Normal range of motion, full passive range of motion without pain and phonation normal. Neck supple. No thyroid mass and no thyromegaly present.   Cardiovascular: Normal rate, regular rhythm, normal heart sounds and intact distal pulses. Exam reveals no gallop and no friction rub.   No murmur heard.  Pulmonary/Chest: Effort normal and breath sounds normal. No respiratory distress. He has no wheezes. He has no rales.   Abdominal: Soft. Bowel sounds are normal.   Musculoskeletal: Normal range of motion.   Lymphadenopathy:     He has no cervical adenopathy.   Neurological: He is alert and oriented to person, place, and time. He has normal strength. No cranial nerve deficit or sensory deficit. Coordination normal.   Skin: Skin is warm and dry.   Psychiatric: He has a normal mood and affect. His speech is normal and behavior is normal. Judgment and thought content normal.         Results for orders placed or performed in visit on 09/13/18   Comprehensive metabolic panel   Result Value Ref Range    Sodium 138 136 - 145 mmol/L    Potassium 4.0 3.5 - 5.1 mmol/L    Chloride 101 95 - 110 mmol/L    CO2 29 23 - 29 mmol/L    Glucose 192 (H) 70 - 110 mg/dL    BUN, Bld 6 6 - 20 mg/dL    " Creatinine 0.8 0.5 - 1.4 mg/dL    Calcium 9.5 8.7 - 10.5 mg/dL    Total Protein 7.0 6.0 - 8.4 g/dL    Albumin 3.9 3.5 - 5.2 g/dL    Total Bilirubin 0.4 0.1 - 1.0 mg/dL    Alkaline Phosphatase 90 55 - 135 U/L    AST 18 10 - 40 U/L    ALT 30 10 - 44 U/L    Anion Gap 8 8 - 16 mmol/L    eGFR if African American >60.0 >60 mL/min/1.73 m^2    eGFR if non African American >60.0 >60 mL/min/1.73 m^2   Hemoglobin A1c   Result Value Ref Range    Hemoglobin A1C 7.1 (H) 4.0 - 5.6 %    Estimated Avg Glucose 157 (H) 68 - 131 mg/dL   Lipid panel   Result Value Ref Range    Cholesterol 113 (L) 120 - 199 mg/dL    Triglycerides 75 30 - 150 mg/dL    HDL 41 40 - 75 mg/dL    LDL Cholesterol 57.0 (L) 63.0 - 159.0 mg/dL    HDL/Chol Ratio 36.3 20.0 - 50.0 %    Total Cholesterol/HDL Ratio 2.8 2.0 - 5.0    Non-HDL Cholesterol 72 mg/dL   Hepatitis C antibody   Result Value Ref Range    Hepatitis C Ab Negative    MICROALBUMIN / CREATININE RATIO URINE   Result Value Ref Range    Microalbum.,U,Random <2.5 ug/mL    Creatinine, Random Ur 19.0 (L) 23.0 - 375.0 mg/dL    Microalb Creat Ratio Unable to calculate 0.0 - 30.0 ug/mg       Assessment:       1. Controlled type 2 diabetes mellitus without complication, without long-term current use of insulin    2. Hypertension, unspecified type    3. Hyperlipidemia, unspecified hyperlipidemia type    4. Fatigue, unspecified type        Plan:       Controlled type 2 diabetes mellitus without complication, without long-term current use of insulin  -     Hemoglobin A1c; Future; Expected date: 11/12/2018  -     Lipid panel; Future; Expected date: 11/12/2018  -     Comprehensive metabolic panel; Future; Expected date: 11/12/2018    Hypertension, unspecified type    Hyperlipidemia, unspecified hyperlipidemia type    Fatigue, unspecified type  -     Testosterone; Future; Expected date: 11/12/2018  -     TSH; Future; Expected date: 11/12/2018    Other orders  -     Influenza - Quadrivalent (3 years & older) (PF)         Reassurance  Fatigue likely related to recent major sinus infection 1 month ago  Discontinue Lexapro; may be source of fatigue  Continue other meds  Flu shot  considering quitting smoking and is trying to cut back  Counseled on regular exercise, maintenance of a healthy weight, balanced diet rich in fruits/vegetables and lean protein, and avoidance of unhealthy habits like smoking and excessive alcohol intake.  F/u 2 months with labs

## 2018-12-06 ENCOUNTER — OFFICE VISIT (OUTPATIENT)
Dept: PODIATRY | Facility: CLINIC | Age: 58
End: 2018-12-06
Payer: COMMERCIAL

## 2018-12-06 VITALS
HEART RATE: 69 BPM | DIASTOLIC BLOOD PRESSURE: 83 MMHG | BODY MASS INDEX: 24.94 KG/M2 | WEIGHT: 178.13 LBS | SYSTOLIC BLOOD PRESSURE: 145 MMHG | HEIGHT: 71 IN

## 2018-12-06 DIAGNOSIS — L84 CORN OR CALLUS: ICD-10-CM

## 2018-12-06 DIAGNOSIS — E11.42 DIABETIC POLYNEUROPATHY ASSOCIATED WITH TYPE 2 DIABETES MELLITUS: ICD-10-CM

## 2018-12-06 DIAGNOSIS — M21.6X1 ACQUIRED EQUINUS DEFORMITY OF BOTH FEET: Primary | ICD-10-CM

## 2018-12-06 DIAGNOSIS — M21.6X2 ACQUIRED EQUINUS DEFORMITY OF BOTH FEET: Primary | ICD-10-CM

## 2018-12-06 PROCEDURE — 3045F PR MOST RECENT HEMOGLOBIN A1C LEVEL 7.0-9.0%: CPT | Mod: CPTII,S$GLB,, | Performed by: PODIATRIST

## 2018-12-06 PROCEDURE — 3077F SYST BP >= 140 MM HG: CPT | Mod: CPTII,S$GLB,, | Performed by: PODIATRIST

## 2018-12-06 PROCEDURE — 99999 PR PBB SHADOW E&M-EST. PATIENT-LVL III: CPT | Mod: PBBFAC,,, | Performed by: PODIATRIST

## 2018-12-06 PROCEDURE — 3079F DIAST BP 80-89 MM HG: CPT | Mod: CPTII,S$GLB,, | Performed by: PODIATRIST

## 2018-12-06 PROCEDURE — 3008F BODY MASS INDEX DOCD: CPT | Mod: CPTII,S$GLB,, | Performed by: PODIATRIST

## 2018-12-06 PROCEDURE — 99213 OFFICE O/P EST LOW 20 MIN: CPT | Mod: S$GLB,,, | Performed by: PODIATRIST

## 2018-12-06 NOTE — PROGRESS NOTES
Subjective:      Patient ID: Austin Smith is a 58 y.o. male.    Chief Complaint: Follow-up (Plantar wart Luke  foot  PCP  jose rafael Sears  11/12/18) and Diabetes Mellitus (A!C 7.1  9/13/18)    Austin is a 58 y.o. male who presents to the clinic upon referral from Dr. Elizabeth ref. provider found  for evaluation and treatment of diabetic feet. Austin has a past medical history of Anticoagulant long-term use, Arthritis, Cataract, DM type 2 (diabetes mellitus, type 2), GERD (gastroesophageal reflux disease), Hyperlipidemia, Hypertension, Iris nevus, Lattice degeneration, Migraine syndrome, Neck pain, and Wears glasses. Patient relates no major problem with feet. Only complaints today consist of pain secondary to calluses bilateral, pain is mostly present with weight bearing.     3/6/18: Patient returns with bilateral foot pain as the calluses have returned, most pain comes from the right lesions. Would like to try and treat them with chemical ablation as discussed last appointment.     3/13/18: Patient returns for follow up chemical ablation of right foot lesions. Feeling much better today, left the dressing on for 2 days, no other complaints at this time.     5/2/18: Patient returns with pain to right plantar foot with associated lesions that returned. Here for further treatment options. Pain with palpation and weight bearing, more so barefoot, somewhat better with supportive shoe wear. No other acute pedal complaints.    5/8/18: Patient reports severe burning pain the first night so he took off the tape and washed off the medicine. There was still some burning until Friday. Feels better today no new pedal complaints at this time.     12/6/18: Patient returns with continued problems to painful bilateral plantar lesions, he relates they are no better and painful with ambulation.     Current shoe gear: Tennis shoes    Hemoglobin A1C   Date Value Ref Range Status   09/13/2018 7.1 (H) 4.0 - 5.6 % Final     Comment:     ADA  Screening Guidelines:  5.7-6.4%  Consistent with prediabetes  >or=6.5%  Consistent with diabetes  High levels of fetal hemoglobin interfere with the HbA1C  assay. Heterozygous hemoglobin variants (HbS, HgC, etc)do  not significantly interfere with this assay.   However, presence of multiple variants may affect accuracy.     05/03/2018 8.8 (H) 4.0 - 5.6 % Final     Comment:     According to ADA guidelines, hemoglobin A1c <7.0% represents  optimal control in non-pregnant diabetic patients. Different  metrics may apply to specific patient populations.   Standards of Medical Care in Diabetes-2016.  For the purpose of screening for the presence of diabetes:  <5.7%     Consistent with the absence of diabetes  5.7-6.4%  Consistent with increasing risk for diabetes   (prediabetes)  >or=6.5%  Consistent with diabetes  Currently, no consensus exists for use of hemoglobin A1c  for diagnosis of diabetes for children.  This Hemoglobin A1c assay has significant interference with fetal   hemoglobin   (HbF). The results are invalid for patients with abnormal amounts of   HbF,   including those with known Hereditary Persistence   of Fetal Hemoglobin. Heterozygous hemoglobin variants (HbAS, HbAC,   HbAD, HbAE, HbA2) do not significantly interfere with this assay;   however, presence of multiple variants in a sample may impact the %   interference.     07/14/2016 6.5 (H) 4.5 - 6.2 % Final     Comment:     According to ADA guidelines, hemoglobin A1C <7.0% represents  optimal control in non-pregnant diabetic patients.  Different  metrics may apply to specific populations.   Standards of Medical Care in Diabetes - 2016.  For the purpose of screening for the presence of diabetes:  <5.7%     Consistent with the absence of diabetes  5.7-6.4%  Consistent with increasing risk for diabetes   (prediabetes)  >or=6.5%  Consistent with diabetes  Currently no consensus exists for use of hemoglobin A1C  for diagnosis of diabetes for children.              Review of Systems   Constitution: Negative for chills and fever.   Cardiovascular: Negative for claudication and leg swelling.   Respiratory: Negative for shortness of breath.    Skin: Positive for suspicious lesions. Negative for itching, nail changes and rash.   Musculoskeletal: Negative for muscle cramps, muscle weakness and myalgias.   Gastrointestinal: Negative for nausea and vomiting.   Neurological: Negative for focal weakness, loss of balance, numbness and paresthesias.           Objective:      Physical Exam   Constitutional: He is oriented to person, place, and time. He appears well-developed and well-nourished. No distress.   Cardiovascular:   Pulses:       Dorsalis pedis pulses are 2+ on the right side, and 2+ on the left side.        Posterior tibial pulses are 2+ on the right side, and 2+ on the left side.   < 3 sec capillary refill time to toes 1-5 bilateral. Toes and feet are warm to touch proximally with normal distal cooling b/l. There is some hair growth on the feet and toes b/l. There is no edema b/l. No spider veins or varicosities present b/l.      Musculoskeletal:   Equinus noted b/l ankles with < 10 deg DF noted. MMT 5/5 in DF/PF/Inv/Ev resistance with no reproduction of pain in any direction. Passive range of motion of ankle and pedal joints is painless b/l.     Feet:   Right Foot:   Protective Sensation: 10 sites tested. 10 sites sensed.   Left Foot:   Protective Sensation: 10 sites tested. 10 sites sensed.   Neurological: He is alert and oriented to person, place, and time. He has normal strength. He displays no atrophy and no tremor. No sensory deficit. He exhibits normal muscle tone.   Negative tinel sign bilateral.   Skin: Skin is warm, dry and intact. Lesion noted. No abrasion, no bruising, no burn, no ecchymosis, no laceration, no petechiae and no rash noted. He is not diaphoretic. No cyanosis or erythema. No pallor. Nails show no clubbing.   Skin temperature, texture and  turgor within normal limits.    Hyperkeratotic lesions noted at the right plantar forefoot plantar second metatarsal head. Left plantar fifth met head.  Pain with palpation.    Left medial heel there is a hyperkeratotic lesion after trimming the skin line remain intact.    Psychiatric: He has a normal mood and affect. His behavior is normal.             Assessment:       Encounter Diagnoses   Name Primary?    Acquired equinus deformity of both feet Yes    Corn or callus     Diabetic polyneuropathy associated with type 2 diabetes mellitus          Plan:       Austin was seen today for follow-up and diabetes mellitus.    Diagnoses and all orders for this visit:    Acquired equinus deformity of both feet    Corn or callus    Diabetic polyneuropathy associated with type 2 diabetes mellitus      I counseled the patient on his conditions, their implications and medical management.    Discussed that these lesions are likely just calluses caused by excessive pressure due to his high arches, plantar flexed metatarsals and equinus deformity    Patient will stretch the tendo achilles complex three times daily as demonstrated in the office.  Literature was dispensed illustrating proper stretching technique.    Patient will obtain over the counter arch supports and wear them in shoes whenever possible.  Athletic shoes intended for walking or running are usually best.    Discussed if pain continues we would plan on a gastroc recession in the OR, possibly a dorsiflexory metatarsal osteotomy to the second metatarsal.     Return in 6 weeks , sooner MARY Rios DPM

## 2018-12-06 NOTE — PATIENT INSTRUCTIONS
3. Orthotic (recommend the following brands: Superfeet, Spenco, Powerstep, Sof Sole Fit Series)

## 2019-01-09 ENCOUNTER — TELEPHONE (OUTPATIENT)
Dept: FAMILY MEDICINE | Facility: CLINIC | Age: 59
End: 2019-01-09

## 2019-01-09 DIAGNOSIS — R53.83 FATIGUE, UNSPECIFIED TYPE: ICD-10-CM

## 2019-01-09 DIAGNOSIS — E11.9 CONTROLLED TYPE 2 DIABETES MELLITUS WITHOUT COMPLICATION, WITHOUT LONG-TERM CURRENT USE OF INSULIN: Primary | ICD-10-CM

## 2019-01-09 DIAGNOSIS — E78.5 HYPERLIPIDEMIA, UNSPECIFIED HYPERLIPIDEMIA TYPE: ICD-10-CM

## 2019-01-09 DIAGNOSIS — I10 HYPERTENSION, UNSPECIFIED TYPE: ICD-10-CM

## 2019-01-09 NOTE — TELEPHONE ENCOUNTER
----- Message from Maureen Burton sent at 1/9/2019  4:07 PM CST -----  Contact: [phone]  Patient is returning nurse's phone call, inquiring about lab work prior to upcoming appt.  Please call patient back at [phone].

## 2019-01-09 NOTE — TELEPHONE ENCOUNTER
----- Message from Shanti Lopez sent at 1/9/2019 10:39 AM CST -----  Contact: Patient  Type: Needs Medical Advice    Who Called:  Patient  Best Call Back Number:   Additional Information: Calling to request the lab orders to be put back in the system. He cancelled the appt. Please advise.

## 2019-01-10 ENCOUNTER — LAB VISIT (OUTPATIENT)
Dept: LAB | Facility: HOSPITAL | Age: 59
End: 2019-01-10
Attending: FAMILY MEDICINE
Payer: COMMERCIAL

## 2019-01-10 DIAGNOSIS — R53.83 FATIGUE, UNSPECIFIED TYPE: ICD-10-CM

## 2019-01-10 DIAGNOSIS — E11.9 CONTROLLED TYPE 2 DIABETES MELLITUS WITHOUT COMPLICATION, WITHOUT LONG-TERM CURRENT USE OF INSULIN: ICD-10-CM

## 2019-01-10 LAB
ALBUMIN SERPL BCP-MCNC: 3.7 G/DL
ALP SERPL-CCNC: 87 U/L
ALT SERPL W/O P-5'-P-CCNC: 15 U/L
ANION GAP SERPL CALC-SCNC: 8 MMOL/L
AST SERPL-CCNC: 13 U/L
BILIRUB SERPL-MCNC: 0.6 MG/DL
BUN SERPL-MCNC: 10 MG/DL
CALCIUM SERPL-MCNC: 9.3 MG/DL
CHLORIDE SERPL-SCNC: 101 MMOL/L
CHOLEST SERPL-MCNC: 131 MG/DL
CHOLEST/HDLC SERPL: 2.9 {RATIO}
CO2 SERPL-SCNC: 27 MMOL/L
CREAT SERPL-MCNC: 0.9 MG/DL
EST. GFR  (AFRICAN AMERICAN): >60 ML/MIN/1.73 M^2
EST. GFR  (NON AFRICAN AMERICAN): >60 ML/MIN/1.73 M^2
ESTIMATED AVG GLUCOSE: 183 MG/DL
GLUCOSE SERPL-MCNC: 173 MG/DL
HBA1C MFR BLD HPLC: 8 %
HDLC SERPL-MCNC: 45 MG/DL
HDLC SERPL: 34.4 %
LDLC SERPL CALC-MCNC: 65.2 MG/DL
NONHDLC SERPL-MCNC: 86 MG/DL
POTASSIUM SERPL-SCNC: 3.9 MMOL/L
PROT SERPL-MCNC: 7.1 G/DL
SODIUM SERPL-SCNC: 136 MMOL/L
TESTOST SERPL-MCNC: 552 NG/DL
TRIGL SERPL-MCNC: 104 MG/DL
TSH SERPL DL<=0.005 MIU/L-ACNC: 1.42 UIU/ML

## 2019-01-10 PROCEDURE — 84403 ASSAY OF TOTAL TESTOSTERONE: CPT

## 2019-01-10 PROCEDURE — 84443 ASSAY THYROID STIM HORMONE: CPT

## 2019-01-10 PROCEDURE — 36415 COLL VENOUS BLD VENIPUNCTURE: CPT | Mod: PO

## 2019-01-10 PROCEDURE — 80061 LIPID PANEL: CPT

## 2019-01-10 PROCEDURE — 83036 HEMOGLOBIN GLYCOSYLATED A1C: CPT

## 2019-01-10 PROCEDURE — 80053 COMPREHEN METABOLIC PANEL: CPT

## 2019-01-17 ENCOUNTER — HOSPITAL ENCOUNTER (OUTPATIENT)
Dept: RADIOLOGY | Facility: CLINIC | Age: 59
Discharge: HOME | End: 2019-01-17
Attending: PODIATRIST
Payer: COMMERCIAL

## 2019-01-17 ENCOUNTER — OFFICE VISIT (OUTPATIENT)
Dept: PODIATRY | Facility: CLINIC | Age: 59
End: 2019-01-17
Payer: COMMERCIAL

## 2019-01-17 VITALS
DIASTOLIC BLOOD PRESSURE: 90 MMHG | WEIGHT: 178.13 LBS | BODY MASS INDEX: 24.94 KG/M2 | HEIGHT: 71 IN | HEART RATE: 75 BPM | SYSTOLIC BLOOD PRESSURE: 155 MMHG

## 2019-01-17 DIAGNOSIS — M21.622 TAILOR'S BUNION OF LEFT FOOT: Primary | ICD-10-CM

## 2019-01-17 DIAGNOSIS — Q66.72 CAVUS DEFORMITY OF LEFT FOOT: ICD-10-CM

## 2019-01-17 DIAGNOSIS — M21.622 TAILOR'S BUNION OF LEFT FOOT: ICD-10-CM

## 2019-01-17 DIAGNOSIS — M62.462 GASTROCNEMIUS EQUINUS, LEFT: ICD-10-CM

## 2019-01-17 PROCEDURE — 3080F PR MOST RECENT DIASTOLIC BLOOD PRESSURE >= 90 MM HG: ICD-10-PCS | Mod: CPTII,S$GLB,, | Performed by: PODIATRIST

## 2019-01-17 PROCEDURE — 3008F BODY MASS INDEX DOCD: CPT | Mod: CPTII,S$GLB,, | Performed by: PODIATRIST

## 2019-01-17 PROCEDURE — 99999 PR PBB SHADOW E&M-EST. PATIENT-LVL IV: ICD-10-PCS | Mod: PBBFAC,,, | Performed by: PODIATRIST

## 2019-01-17 PROCEDURE — 99213 PR OFFICE/OUTPT VISIT, EST, LEVL III, 20-29 MIN: ICD-10-PCS | Mod: S$GLB,,, | Performed by: PODIATRIST

## 2019-01-17 PROCEDURE — 3077F PR MOST RECENT SYSTOLIC BLOOD PRESSURE >= 140 MM HG: ICD-10-PCS | Mod: CPTII,S$GLB,, | Performed by: PODIATRIST

## 2019-01-17 PROCEDURE — 99213 OFFICE O/P EST LOW 20 MIN: CPT | Mod: S$GLB,,, | Performed by: PODIATRIST

## 2019-01-17 PROCEDURE — 73630 XR FOOT COMPLETE 3 VIEW LEFT: ICD-10-PCS | Mod: 26,LT,S$GLB, | Performed by: RADIOLOGY

## 2019-01-17 PROCEDURE — 73630 X-RAY EXAM OF FOOT: CPT | Mod: TC,FY,PO,LT

## 2019-01-17 PROCEDURE — 3077F SYST BP >= 140 MM HG: CPT | Mod: CPTII,S$GLB,, | Performed by: PODIATRIST

## 2019-01-17 PROCEDURE — 99999 PR PBB SHADOW E&M-EST. PATIENT-LVL IV: CPT | Mod: PBBFAC,,, | Performed by: PODIATRIST

## 2019-01-17 PROCEDURE — 3008F PR BODY MASS INDEX (BMI) DOCUMENTED: ICD-10-PCS | Mod: CPTII,S$GLB,, | Performed by: PODIATRIST

## 2019-01-17 PROCEDURE — 3080F DIAST BP >= 90 MM HG: CPT | Mod: CPTII,S$GLB,, | Performed by: PODIATRIST

## 2019-01-17 PROCEDURE — 73630 X-RAY EXAM OF FOOT: CPT | Mod: 26,LT,S$GLB, | Performed by: RADIOLOGY

## 2019-01-17 NOTE — PROGRESS NOTES
Subjective:      Patient ID: Austin Smith is a 58 y.o. male.    Chief Complaint: Follow-up (Luke foot callouses   PCP  Suleman Liang   11/12/18) and Diabetes Mellitus (A!C   8.0   1/10/19)    Austin is a 58 y.o. male who presents to the clinic upon referral from Dr. Clara martinez. provider found  for evaluation and treatment of diabetic feet. Austin has a past medical history of Anticoagulant long-term use, Arthritis, Cataract, DM type 2 (diabetes mellitus, type 2), GERD (gastroesophageal reflux disease), Hyperlipidemia, Hypertension, Iris nevus, Lattice degeneration, Migraine syndrome, Neck pain, and Wears glasses. Patient relates no major problem with feet. Only complaints today consist of pain secondary to calluses bilateral, pain is mostly present with weight bearing.     3/6/18: Patient returns with bilateral foot pain as the calluses have returned, most pain comes from the right lesions. Would like to try and treat them with chemical ablation as discussed last appointment.     3/13/18: Patient returns for follow up chemical ablation of right foot lesions. Feeling much better today, left the dressing on for 2 days, no other complaints at this time.     5/2/18: Patient returns with pain to right plantar foot with associated lesions that returned. Here for further treatment options. Pain with palpation and weight bearing, more so barefoot, somewhat better with supportive shoe wear. No other acute pedal complaints.    5/8/18: Patient reports severe burning pain the first night so he took off the tape and washed off the medicine. There was still some burning until Friday. Feels better today no new pedal complaints at this time.     12/6/18: Patient returns with continued problems to painful bilateral plantar lesions, he relates they are no better and painful with ambulation.    1/17/19: Patient returns for follow up bilateral foot pain left worse than right with plantar pain along the metatarsal heads with associated  lesions. Inserts and stretches are not helping and he would like to discuss more aggressive treatments including surgery.     Current shoe gear: Tennis shoes    Hemoglobin A1C   Date Value Ref Range Status   01/10/2019 8.0 (H) 4.0 - 5.6 % Final     Comment:     ADA Screening Guidelines:  5.7-6.4%  Consistent with prediabetes  >or=6.5%  Consistent with diabetes  High levels of fetal hemoglobin interfere with the HbA1C  assay. Heterozygous hemoglobin variants (HbS, HgC, etc)do  not significantly interfere with this assay.   However, presence of multiple variants may affect accuracy.     09/13/2018 7.1 (H) 4.0 - 5.6 % Final     Comment:     ADA Screening Guidelines:  5.7-6.4%  Consistent with prediabetes  >or=6.5%  Consistent with diabetes  High levels of fetal hemoglobin interfere with the HbA1C  assay. Heterozygous hemoglobin variants (HbS, HgC, etc)do  not significantly interfere with this assay.   However, presence of multiple variants may affect accuracy.     05/03/2018 8.8 (H) 4.0 - 5.6 % Final     Comment:     According to ADA guidelines, hemoglobin A1c <7.0% represents  optimal control in non-pregnant diabetic patients. Different  metrics may apply to specific patient populations.   Standards of Medical Care in Diabetes-2016.  For the purpose of screening for the presence of diabetes:  <5.7%     Consistent with the absence of diabetes  5.7-6.4%  Consistent with increasing risk for diabetes   (prediabetes)  >or=6.5%  Consistent with diabetes  Currently, no consensus exists for use of hemoglobin A1c  for diagnosis of diabetes for children.  This Hemoglobin A1c assay has significant interference with fetal   hemoglobin   (HbF). The results are invalid for patients with abnormal amounts of   HbF,   including those with known Hereditary Persistence   of Fetal Hemoglobin. Heterozygous hemoglobin variants (HbAS, HbAC,   HbAD, HbAE, HbA2) do not significantly interfere with this assay;   however, presence of multiple  variants in a sample may impact the %   interference.             Review of Systems   Constitution: Negative for chills and fever.   Cardiovascular: Negative for claudication and leg swelling.   Respiratory: Negative for shortness of breath.    Skin: Positive for suspicious lesions. Negative for itching, nail changes and rash.   Musculoskeletal: Negative for muscle cramps, muscle weakness and myalgias.   Gastrointestinal: Negative for nausea and vomiting.   Neurological: Negative for focal weakness, loss of balance, numbness and paresthesias.           Objective:      Physical Exam   Constitutional: He is oriented to person, place, and time. He appears well-developed and well-nourished. No distress.   Cardiovascular:   Pulses:       Dorsalis pedis pulses are 2+ on the right side, and 2+ on the left side.        Posterior tibial pulses are 2+ on the right side, and 2+ on the left side.   < 3 sec capillary refill time to toes 1-5 bilateral. Toes and feet are warm to touch proximally with normal distal cooling b/l. There is some hair growth on the feet and toes b/l. There is no edema b/l. No spider veins or varicosities present b/l.      Musculoskeletal:   Bilateral cavus foot type with noted plantar flexed first rays, there is noted inversion of the calcaneus that reduces with offloading the first ray.     Tailor bunion present 5th mtpj left foot with prominent bony bump lateral 5th mtpj, and pain to palpation without evidence of trauma or infection.    Equinus noted b/l ankles with < 1 deg DF noted with knee extended and <10 with knee flexed. MMT 5/5 in DF/PF/Inv/Ev resistance with no reproduction of pain in any direction. Passive range of motion of ankle and pedal joints is painless b/l.     Feet:   Right Foot:   Protective Sensation: 10 sites tested. 10 sites sensed.   Left Foot:   Protective Sensation: 10 sites tested. 10 sites sensed.   Neurological: He is alert and oriented to person, place, and time. He has  normal strength. He displays no atrophy and no tremor. No sensory deficit. He exhibits normal muscle tone.   Negative tinel sign bilateral.   Skin: Skin is warm, dry and intact. Lesion noted. No abrasion, no bruising, no burn, no ecchymosis, no laceration, no petechiae and no rash noted. He is not diaphoretic. No cyanosis or erythema. No pallor. Nails show no clubbing.   Skin temperature, texture and turgor within normal limits.    Hyperkeratotic lesions noted at the right plantar forefoot plantar second metatarsal head. Left plantar fifth met head. Bilateral plantar first metatarsal heads. Pain with palpation.    Left medial heel there is a hyperkeratotic lesion after trimming the skin line remain intact.    Psychiatric: He has a normal mood and affect. His behavior is normal.             Assessment:       Encounter Diagnoses   Name Primary?    Tailor's bunion of left foot Yes    Cavus deformity of left foot     Gastrocnemius equinus, left          Plan:       Austin was seen today for follow-up and diabetes mellitus.    Diagnoses and all orders for this visit:    Tailor's bunion of left foot  -     X-Ray Foot Complete Left; Future  -     Case Request Operating Room: OSTEOTOMY, METATARSAL BONE, RESECTION, MUSCLE, GASTROCNEMIUS, EXCISION, BUNIONETTE    Cavus deformity of left foot  -     X-Ray Foot Complete Left; Future  -     Case Request Operating Room: OSTEOTOMY, METATARSAL BONE, RESECTION, MUSCLE, GASTROCNEMIUS, EXCISION, BUNIONETTE    Gastrocnemius equinus, left  -     X-Ray Foot Complete Left; Future  -     Case Request Operating Room: OSTEOTOMY, METATARSAL BONE, RESECTION, MUSCLE, GASTROCNEMIUS, EXCISION, BUNIONETTE      I counseled the patient on his conditions, their implications and medical management.    Discussed that these lesions are likely just calluses caused by excessive pressure due to his high arches, plantar flexed metatarsals and equinus deformity    Discussed surgical intervention in detail,  will obtain x-rays today to further plan the procedure. Discussed that we will do a gastroc recession to offload the forefoot pressure secondary to the excessive plantar flexion at the ankle. We will remove the fifth metatarsal spurring and possible tailors bunionectomy with dorsiflexory osteotomy. Will Dorsiflex the first ray with screw fixation, tailors bunionectomy, and gastroc recession    Will call to discuss further after x-ray obtained and will bring consent to the surgery center    Case request placed for 2/7/[facility]    Referred to PCP for medical optimization and risk stratification    Discussed recovery with 6 weeks in the boot and likely PT after, 3 months until high impact exercises.    Return 1 week post op    Austen Rios DPM

## 2019-01-29 ENCOUNTER — TELEPHONE (OUTPATIENT)
Dept: FAMILY MEDICINE | Facility: CLINIC | Age: 59
End: 2019-01-29

## 2019-01-29 NOTE — TELEPHONE ENCOUNTER
----- Message from Phoebe Correa sent at 1/29/2019 10:06 AM CST -----  Contact: Self  Patient is having podiatry surgery on 2/5 or 2/6 and needs surgical clearance from the doctor. I can't schedule him until 2/6 so requesting a call back to get him in sooner, if needed.  Call back at [phone] (home).  Thanks

## 2019-01-30 ENCOUNTER — TELEPHONE (OUTPATIENT)
Dept: FAMILY MEDICINE | Facility: CLINIC | Age: 59
End: 2019-01-30

## 2019-01-31 NOTE — TELEPHONE ENCOUNTER
Needs appt with Lashell or JOSELINE for clearance [facility] BP was elevated at last podiatry appt.

## 2019-02-02 ENCOUNTER — ANESTHESIA EVENT (OUTPATIENT)
Dept: SURGERY | Facility: AMBULARY SURGERY CENTER | Age: 59
End: 2019-02-02

## 2019-02-04 ENCOUNTER — TELEPHONE (OUTPATIENT)
Dept: PODIATRY | Facility: CLINIC | Age: 59
End: 2019-02-04

## 2019-02-04 NOTE — TELEPHONE ENCOUNTER
Spoke with patient about his H&P appointment being cancelled.  Patient stated that he has to cancel the surgery for now.  Will reschedule if he decides to have it.  Dr Rios informed.

## 2019-02-07 ENCOUNTER — ANESTHESIA (OUTPATIENT)
Dept: SURGERY | Facility: AMBULARY SURGERY CENTER | Age: 59
End: 2019-02-07

## 2019-03-21 ENCOUNTER — OFFICE VISIT (OUTPATIENT)
Dept: PODIATRY | Facility: CLINIC | Age: 59
End: 2019-03-21
Payer: COMMERCIAL

## 2019-03-21 VITALS
HEART RATE: 70 BPM | WEIGHT: 181 LBS | DIASTOLIC BLOOD PRESSURE: 90 MMHG | HEIGHT: 71 IN | SYSTOLIC BLOOD PRESSURE: 161 MMHG | BODY MASS INDEX: 25.34 KG/M2

## 2019-03-21 DIAGNOSIS — M62.462 GASTROCNEMIUS EQUINUS, LEFT: ICD-10-CM

## 2019-03-21 DIAGNOSIS — M20.42 HAMMER TOES OF BOTH FEET: ICD-10-CM

## 2019-03-21 DIAGNOSIS — Q66.72 CAVUS DEFORMITY OF LEFT FOOT: Primary | ICD-10-CM

## 2019-03-21 DIAGNOSIS — L84 CORN OR CALLUS: ICD-10-CM

## 2019-03-21 DIAGNOSIS — M20.41 HAMMER TOES OF BOTH FEET: ICD-10-CM

## 2019-03-21 DIAGNOSIS — Q66.71 CAVUS DEFORMITY OF RIGHT FOOT: ICD-10-CM

## 2019-03-21 PROCEDURE — 3080F DIAST BP >= 90 MM HG: CPT | Mod: CPTII,S$GLB,, | Performed by: PODIATRIST

## 2019-03-21 PROCEDURE — 99213 OFFICE O/P EST LOW 20 MIN: CPT | Mod: S$GLB,,, | Performed by: PODIATRIST

## 2019-03-21 PROCEDURE — 99999 PR PBB SHADOW E&M-EST. PATIENT-LVL III: ICD-10-PCS | Mod: PBBFAC,,, | Performed by: PODIATRIST

## 2019-03-21 PROCEDURE — 3077F PR MOST RECENT SYSTOLIC BLOOD PRESSURE >= 140 MM HG: ICD-10-PCS | Mod: CPTII,S$GLB,, | Performed by: PODIATRIST

## 2019-03-21 PROCEDURE — 3077F SYST BP >= 140 MM HG: CPT | Mod: CPTII,S$GLB,, | Performed by: PODIATRIST

## 2019-03-21 PROCEDURE — 3080F PR MOST RECENT DIASTOLIC BLOOD PRESSURE >= 90 MM HG: ICD-10-PCS | Mod: CPTII,S$GLB,, | Performed by: PODIATRIST

## 2019-03-21 PROCEDURE — 99213 PR OFFICE/OUTPT VISIT, EST, LEVL III, 20-29 MIN: ICD-10-PCS | Mod: S$GLB,,, | Performed by: PODIATRIST

## 2019-03-21 PROCEDURE — 3008F BODY MASS INDEX DOCD: CPT | Mod: CPTII,S$GLB,, | Performed by: PODIATRIST

## 2019-03-21 PROCEDURE — 3008F PR BODY MASS INDEX (BMI) DOCUMENTED: ICD-10-PCS | Mod: CPTII,S$GLB,, | Performed by: PODIATRIST

## 2019-03-21 PROCEDURE — 99999 PR PBB SHADOW E&M-EST. PATIENT-LVL III: CPT | Mod: PBBFAC,,, | Performed by: PODIATRIST

## 2019-03-21 NOTE — PATIENT INSTRUCTIONS
1. Stretch calf and plantar fascia at least 3x per day for 30 sec and before getting out of bed.    2. Supportive shoes at all times (athletic shoe including doll Ghost, new balance, asics, HOKA Bondi or casual shoes like Dansko, Syeda, Naot, Vionoic, Fit flop  clog or wedge with extra heel padding and arch support. For house slippers would recommend Fitflop or Spenco found on amazon.com, Never walk barefoot or in flats.    ([employer]ity sports, Ph[employer]ippides, [place] running company, Masseys, Goodfeet, Cantilever, Feet First, Foot Solutions, Therapeutic shoes, SAS, [facility] [employer] [facility] pro shop) [email or web address]    3. Orthotic (recommend the following brands: Superfeet, Spenco, Powerstep, Sof Sole Fit Series)

## 2019-03-21 NOTE — PROGRESS NOTES
Subjective:      Patient ID: Austin Smith is a 59 y.o. male.    Chief Complaint: Callouses (Bi Lateral)    Austin is a 59 y.o. male who presents to the clinic upon referral from Dr. Clara martinez. provider found  for evaluation and treatment of diabetic feet. Austin has a past medical history of Anticoagulant long-term use, Arthritis, Cataract, DM type 2 (diabetes mellitus, type 2), GERD (gastroesophageal reflux disease), Hyperlipidemia, Hypertension, Iris nevus, Lattice degeneration, Migraine syndrome, Neck pain, and Wears glasses. Patient relates no major problem with feet. Only complaints today consist of pain secondary to calluses bilateral, pain is mostly present with weight bearing.     3/6/18: Patient returns with bilateral foot pain as the calluses have returned, most pain comes from the right lesions. Would like to try and treat them with chemical ablation as discussed last appointment.     3/13/18: Patient returns for follow up chemical ablation of right foot lesions. Feeling much better today, left the dressing on for 2 days, no other complaints at this time.     5/2/18: Patient returns with pain to right plantar foot with associated lesions that returned. Here for further treatment options. Pain with palpation and weight bearing, more so barefoot, somewhat better with supportive shoe wear. No other acute pedal complaints.    5/8/18: Patient reports severe burning pain the first night so he took off the tape and washed off the medicine. There was still some burning until Friday. Feels better today no new pedal complaints at this time.     12/6/18: Patient returns with continued problems to painful bilateral plantar lesions, he relates they are no better and painful with ambulation.    1/17/19: Patient returns for follow up bilateral foot pain left worse than right with plantar pain along the metatarsal heads with associated lesions. Inserts and stretches are not helping and he would like to discuss more  aggressive treatments including surgery.     3/21/19: Patient returns for continued pain to the callused areas secondary to his high arches and equinus, he would like to discuss more non surgical options going forward.     Current shoe gear: Tennis shoes    Hemoglobin A1C   Date Value Ref Range Status   01/10/2019 8.0 (H) 4.0 - 5.6 % Final     Comment:     ADA Screening Guidelines:  5.7-6.4%  Consistent with prediabetes  >or=6.5%  Consistent with diabetes  High levels of fetal hemoglobin interfere with the HbA1C  assay. Heterozygous hemoglobin variants (HbS, HgC, etc)do  not significantly interfere with this assay.   However, presence of multiple variants may affect accuracy.     09/13/2018 7.1 (H) 4.0 - 5.6 % Final     Comment:     ADA Screening Guidelines:  5.7-6.4%  Consistent with prediabetes  >or=6.5%  Consistent with diabetes  High levels of fetal hemoglobin interfere with the HbA1C  assay. Heterozygous hemoglobin variants (HbS, HgC, etc)do  not significantly interfere with this assay.   However, presence of multiple variants may affect accuracy.     05/03/2018 8.8 (H) 4.0 - 5.6 % Final     Comment:     According to ADA guidelines, hemoglobin A1c <7.0% represents  optimal control in non-pregnant diabetic patients. Different  metrics may apply to specific patient populations.   Standards of Medical Care in Diabetes-2016.  For the purpose of screening for the presence of diabetes:  <5.7%     Consistent with the absence of diabetes  5.7-6.4%  Consistent with increasing risk for diabetes   (prediabetes)  >or=6.5%  Consistent with diabetes  Currently, no consensus exists for use of hemoglobin A1c  for diagnosis of diabetes for children.  This Hemoglobin A1c assay has significant interference with fetal   hemoglobin   (HbF). The results are invalid for patients with abnormal amounts of   HbF,   including those with known Hereditary Persistence   of Fetal Hemoglobin. Heterozygous hemoglobin variants (HbAS, HbAC,    HbAD, HbAE, HbA2) do not significantly interfere with this assay;   however, presence of multiple variants in a sample may impact the %   interference.             Review of Systems   Constitution: Negative for chills and fever.   Cardiovascular: Negative for claudication and leg swelling.   Respiratory: Negative for shortness of breath.    Skin: Positive for suspicious lesions. Negative for itching, nail changes and rash.   Musculoskeletal: Negative for muscle cramps, muscle weakness and myalgias.   Gastrointestinal: Negative for nausea and vomiting.   Neurological: Negative for focal weakness, loss of balance, numbness and paresthesias.           Objective:      Physical Exam   Constitutional: He is oriented to person, place, and time. He appears well-developed and well-nourished. No distress.   Cardiovascular:   Pulses:       Dorsalis pedis pulses are 2+ on the right side, and 2+ on the left side.        Posterior tibial pulses are 2+ on the right side, and 2+ on the left side.   < 3 sec capillary refill time to toes 1-5 bilateral. Toes and feet are warm to touch proximally with normal distal cooling b/l. There is some hair growth on the feet and toes b/l. There is no edema b/l. No spider veins or varicosities present b/l.      Musculoskeletal:   Bilateral cavus foot type with noted plantar flexed first rays, there is noted inversion of the calcaneus that reduces with offloading the first ray.     Tailor bunion present 5th mtpj left foot with prominent bony bump lateral 5th mtpj, and pain to palpation without evidence of trauma or infection.    Equinus noted b/l ankles with < 1 deg DF noted with knee extended and <10 with knee flexed. MMT 5/5 in DF/PF/Inv/Ev resistance with no reproduction of pain in any direction. Passive range of motion of ankle and pedal joints is painless b/l.     Feet:   Right Foot:   Protective Sensation: 10 sites tested. 10 sites sensed.   Left Foot:   Protective Sensation: 10 sites  tested. 10 sites sensed.   Neurological: He is alert and oriented to person, place, and time. He has normal strength. He displays no atrophy and no tremor. No sensory deficit. He exhibits normal muscle tone.   Negative tinel sign bilateral.   Skin: Skin is warm, dry and intact. Lesion noted. No abrasion, no bruising, no burn, no ecchymosis, no laceration, no petechiae and no rash noted. He is not diaphoretic. No cyanosis or erythema. No pallor. Nails show no clubbing.   Skin temperature, texture and turgor within normal limits.    Hyperkeratotic lesions noted at the right plantar forefoot plantar second metatarsal head. Left plantar fifth met head. Bilateral plantar first metatarsal heads. Pain with palpation.    Left medial heel there is a hyperkeratotic lesion after trimming the skin line remain intact.    Psychiatric: He has a normal mood and affect. His behavior is normal.             Assessment:       Encounter Diagnoses   Name Primary?    Cavus deformity of left foot Yes    Cavus deformity of right foot     Gastrocnemius equinus, left     Corn or callus     Hammer toes of both feet          Plan:       Austin was seen today for [facility].    Diagnoses and all orders for this visit:    Cavus deformity of left foot  -     ORTHOTIC DEVICE (DME)    Cavus deformity of right foot  -     ORTHOTIC DEVICE (DME)    Gastrocnemius equinus, left  -     ORTHOTIC DEVICE (DME)    Corn or callus  -     ORTHOTIC DEVICE (DME)    Hammer toes of both feet  -     ORTHOTIC DEVICE (DME)      I counseled the patient on his conditions, their implications and medical management.    Discussed that these lesions are likely just calluses caused by excessive pressure due to his high arches, plantar flexed metatarsals and equinus deformity    Discussed surgical intervention in detail previously but he would like to forego the surgical options for now.    Prescription orthotics were prescribed to better offload the painful  areas    Patient will stretch the tendo achilles complex three times daily as demonstrated in the office.  Literature was dispensed illustrating proper stretching technique.    Recommend [employer] Shoes or similar, a list was given.    Return PRN proc B for callus debridement.    Austen Rios DPM

## 2019-04-29 DIAGNOSIS — E11.9 TYPE 2 DIABETES MELLITUS WITHOUT COMPLICATION: ICD-10-CM

## 2019-05-06 ENCOUNTER — PATIENT OUTREACH (OUTPATIENT)
Dept: ADMINISTRATIVE | Facility: HOSPITAL | Age: 59
End: 2019-05-06

## 2019-05-06 NOTE — PROGRESS NOTES
Health Maintenance Due   Topic Date Due    Pneumococcal Vaccine (Medium Risk) (1 of 1 - PPSV23) 03/17/1981    Colonoscopy  03/17/2012    Hemoglobin A1c  04/10/2019     Chart review complete/scrubbed 05/06/2019  Bulk order report

## 2019-05-06 NOTE — LETTER
May 14, 2019    Austin Smith  [address] [place] [address]             [facility]  [address] [address]  Phone: [phone] Dear Mr. Smith:    We have tried to reach you by mychart unsuccessfully.    [facility] is committed to your overall health and would like to ensure that you are up to date on your recommended test and/or procedures.   Suleman Jernigan MD has found that your chart shows you may be due for the following:     Pneumococcal Vaccine   Diabetic lab testing, order placed   Annual Dilated Eye Exam due soon/June     Also, Our records indicate that you are overdue for your colorectal cancer screening.  After reviewing your chart, it has been documented that a FIT KIT (colorectal cancer screening kit) was given at a clinic visit or  mailed to you,  but the lab has yet to receive the kit so that we may update your chart.   This is a friendly reminder to complete this test (the instructions will tell you how) and then return your sample in the postage-paid return envelope within 24 hours of collection.  Please remember to put the collection date on your sample.     If you have had any of the above done at another facility, please let us know so that we may obtain copies from that facility.  If you have a copy of these records, please provide a copy for us to scan into your chart.  You are welcome to request that the report be faxed to us at  ([phone]).       If you have an upcoming scheduled appointment for the above test and/or procedures, please disregard this letter. Otherwise, please schedule these appointments at your earliest convenience by calling [phone] or going to [email or web address].     Thank you for letting us care for you,   Carol Zazueta, Care Coordinator   [facility]   Phone: [phone]   Fax: [phone]   If you have any questions or concerns, please don't hesitate to call.

## 2019-05-07 ENCOUNTER — OFFICE VISIT (OUTPATIENT)
Dept: FAMILY MEDICINE | Facility: CLINIC | Age: 59
End: 2019-05-07
Payer: COMMERCIAL

## 2019-05-07 ENCOUNTER — TELEPHONE (OUTPATIENT)
Dept: FAMILY MEDICINE | Facility: CLINIC | Age: 59
End: 2019-05-07

## 2019-05-07 VITALS
HEIGHT: 71 IN | DIASTOLIC BLOOD PRESSURE: 88 MMHG | RESPIRATION RATE: 16 BRPM | HEART RATE: 76 BPM | WEIGHT: 181.19 LBS | BODY MASS INDEX: 25.37 KG/M2 | SYSTOLIC BLOOD PRESSURE: 152 MMHG

## 2019-05-07 DIAGNOSIS — F41.9 ANXIETY: ICD-10-CM

## 2019-05-07 DIAGNOSIS — Z91.148 NON COMPLIANCE W MEDICATION REGIMEN: ICD-10-CM

## 2019-05-07 DIAGNOSIS — E11.9 CONTROLLED TYPE 2 DIABETES MELLITUS WITHOUT COMPLICATION, WITHOUT LONG-TERM CURRENT USE OF INSULIN: Primary | ICD-10-CM

## 2019-05-07 DIAGNOSIS — F32.89 OTHER DEPRESSION: ICD-10-CM

## 2019-05-07 DIAGNOSIS — R06.02 SOB (SHORTNESS OF BREATH): ICD-10-CM

## 2019-05-07 DIAGNOSIS — E78.2 MIXED HYPERLIPIDEMIA: ICD-10-CM

## 2019-05-07 DIAGNOSIS — I10 ESSENTIAL HYPERTENSION: ICD-10-CM

## 2019-05-07 DIAGNOSIS — Z72.0 TOBACCO ABUSE: ICD-10-CM

## 2019-05-07 LAB — GLUCOSE SERPL-MCNC: 145 MG/DL (ref 70–110)

## 2019-05-07 PROCEDURE — 99214 OFFICE O/P EST MOD 30 MIN: CPT | Mod: 25,S$GLB,, | Performed by: PHYSICIAN ASSISTANT

## 2019-05-07 PROCEDURE — 3008F BODY MASS INDEX DOCD: CPT | Mod: CPTII,S$GLB,, | Performed by: PHYSICIAN ASSISTANT

## 2019-05-07 PROCEDURE — 3079F PR MOST RECENT DIASTOLIC BLOOD PRESSURE 80-89 MM HG: ICD-10-PCS | Mod: CPTII,S$GLB,, | Performed by: PHYSICIAN ASSISTANT

## 2019-05-07 PROCEDURE — 3045F PR MOST RECENT HEMOGLOBIN A1C LEVEL 7.0-9.0%: CPT | Mod: CPTII,S$GLB,, | Performed by: PHYSICIAN ASSISTANT

## 2019-05-07 PROCEDURE — 3077F SYST BP >= 140 MM HG: CPT | Mod: CPTII,S$GLB,, | Performed by: PHYSICIAN ASSISTANT

## 2019-05-07 PROCEDURE — 3045F PR MOST RECENT HEMOGLOBIN A1C LEVEL 7.0-9.0%: ICD-10-PCS | Mod: CPTII,S$GLB,, | Performed by: PHYSICIAN ASSISTANT

## 2019-05-07 PROCEDURE — 3008F PR BODY MASS INDEX (BMI) DOCUMENTED: ICD-10-PCS | Mod: CPTII,S$GLB,, | Performed by: PHYSICIAN ASSISTANT

## 2019-05-07 PROCEDURE — 82962 GLUCOSE BLOOD TEST: CPT | Mod: S$GLB,,, | Performed by: PHYSICIAN ASSISTANT

## 2019-05-07 PROCEDURE — 99214 PR OFFICE/OUTPT VISIT, EST, LEVL IV, 30-39 MIN: ICD-10-PCS | Mod: 25,S$GLB,, | Performed by: PHYSICIAN ASSISTANT

## 2019-05-07 PROCEDURE — 3077F PR MOST RECENT SYSTOLIC BLOOD PRESSURE >= 140 MM HG: ICD-10-PCS | Mod: CPTII,S$GLB,, | Performed by: PHYSICIAN ASSISTANT

## 2019-05-07 PROCEDURE — 99999 PR PBB SHADOW E&M-EST. PATIENT-LVL III: CPT | Mod: PBBFAC,,, | Performed by: PHYSICIAN ASSISTANT

## 2019-05-07 PROCEDURE — 82962 POCT GLUCOSE, HAND-HELD DEVICE: ICD-10-PCS | Mod: S$GLB,,, | Performed by: PHYSICIAN ASSISTANT

## 2019-05-07 PROCEDURE — 99999 PR PBB SHADOW E&M-EST. PATIENT-LVL III: ICD-10-PCS | Mod: PBBFAC,,, | Performed by: PHYSICIAN ASSISTANT

## 2019-05-07 PROCEDURE — 3079F DIAST BP 80-89 MM HG: CPT | Mod: CPTII,S$GLB,, | Performed by: PHYSICIAN ASSISTANT

## 2019-05-07 RX ORDER — LISINOPRIL AND HYDROCHLOROTHIAZIDE 20; 25 MG/1; MG/1
1 TABLET ORAL DAILY
Qty: 30 TABLET | Refills: 11 | Status: SHIPPED | OUTPATIENT
Start: 2019-05-07 | End: 2020-03-05 | Stop reason: SDUPTHER

## 2019-05-07 RX ORDER — BUPROPION HYDROCHLORIDE 150 MG/1
150 TABLET ORAL 2 TIMES DAILY
Qty: 60 TABLET | Refills: 11 | Status: SHIPPED | OUTPATIENT
Start: 2019-05-07 | End: 2020-03-05

## 2019-05-07 NOTE — PROGRESS NOTES
"Subjective:       Patient ID: Austin Smith is a 59 y.o. male.    Chief Complaint: Fatigue and Shortness of Breath (patient reports that he has had these episodes " a few times in the last week")    HPI   Fatigue x 3 mos  Mild recent increase of fatigue  Eating OK   Urine and bowels normal  Recent in crease in HA's  Pt with Migraine HA yrs ago  Pt continues to smoke tobacco daily small cigars about 20 ( 1 pack ) daily  Pt not compliant taking any meds  Not taking any meds x 3 or 4 wks                                                                                                                                                    Review of Systems   Constitutional: Positive for activity change and fatigue. Negative for appetite change, chills, diaphoresis, fever and unexpected weight change.   HENT: Negative.    Eyes: Negative.    Respiratory: Positive for shortness of breath. Negative for cough and wheezing.    Cardiovascular: Negative.  Negative for chest pain, palpitations and leg swelling.   Gastrointestinal: Negative.    Endocrine: Negative.    Genitourinary: Negative.    Musculoskeletal: Negative.    Skin: Negative.  Negative for rash.   Neurological: Negative.    Psychiatric/Behavioral: Positive for confusion, decreased concentration, dysphoric mood and sleep disturbance. Negative for agitation, behavioral problems, hallucinations, self-injury and suicidal ideas. The patient is nervous/anxious. The patient is not hyperactive.        Objective:      Physical Exam   Constitutional: He is oriented to person, place, and time. He appears well-developed and well-nourished. No distress.   HENT:   Head: Normocephalic and atraumatic.   Right Ear: External ear normal.   Left Ear: External ear normal.   Nose: Nose normal.   Mouth/Throat: Oropharynx is clear and moist. No oropharyngeal exudate.   Sinuses non tender  Mucus clear   Eyes: Pupils are equal, round, and reactive to light. Conjunctivae and EOM are normal. " No scleral icterus.   Neck: Normal range of motion. Neck supple. No tracheal deviation present. No thyromegaly present.   Cardiovascular: Normal rate, regular rhythm, normal heart sounds and intact distal pulses. Exam reveals no gallop and no friction rub.   No murmur heard.  Pulmonary/Chest: Effort normal and breath sounds normal. No stridor. No respiratory distress. He has no wheezes. He has no rales. He exhibits no tenderness.   Abdominal: Soft. Bowel sounds are normal. He exhibits no distension and no mass. There is no tenderness. There is no rebound and no guarding. No hernia.   No organomegaly   Musculoskeletal: He exhibits tenderness. He exhibits no edema.   Lymphadenopathy:     He has no cervical adenopathy.   Neurological: He is alert and oriented to person, place, and time. He displays normal reflexes. No cranial nerve deficit or sensory deficit. He exhibits normal muscle tone. Coordination normal.   Skin: Skin is warm and dry. No rash noted.   Psychiatric: His behavior is normal. Judgment and thought content normal.   Depressed mood   Vitals reviewed.      Assessment:       1. Controlled type 2 diabetes mellitus without complication, without long-term current use of insulin    2. SOB (shortness of breath)    3. Anxiety    4. Essential hypertension    5. Mixed hyperlipidemia    6. Tobacco abuse    7. Other depression    8. Non compliance w medication regimen        Plan:       Controlled type 2 diabetes mellitus without complication, without long-term current use of insulin  -     POCT Glucose, Hand-Held Device    SOB (shortness of breath)    Anxiety  -     buPROPion (WELLBUTRIN XL) 150 MG TB24 tablet; Take 1 tablet (150 mg total) by mouth 2 (two) times daily.  Dispense: 60 tablet; Refill: 11    Essential hypertension  -     lisinopril-hydrochlorothiazide (PRINZIDE,ZESTORETIC) 20-25 mg Tab; Take 1 tablet by mouth once daily.  Dispense: 30 tablet; Refill: 11    Mixed hyperlipidemia    Tobacco abuse    Other  depression  -     buPROPion (WELLBUTRIN XL) 150 MG TB24 tablet; Take 1 tablet (150 mg total) by mouth 2 (two) times daily.  Dispense: 60 tablet; Refill: 11    Non compliance w medication regimen  -     buPROPion (WELLBUTRIN XL) 150 MG TB24 tablet; Take 1 tablet (150 mg total) by mouth 2 (two) times daily.  Dispense: 60 tablet; Refill: 11    discussed taking meds properly  F/u in 4 to 6 wks  Will discuss pt's other health issues in future   Pt will not see a therapist at this time

## 2019-05-07 NOTE — TELEPHONE ENCOUNTER
----- Message from Dyan Locke sent at 5/7/2019 10:37 AM CDT -----  Contact: Patient  Type:  Sooner Apoointment Request    Caller is requesting a sooner appointment.  Caller declined first available appointment listed below.  Caller will not accept being placed on the waitlist and is requesting a message be sent to doctor.    Name of Caller:  Patient  When is the first available appointment?  5/30/19  Symptoms:  SOB  Best Call Back Number:  [phone]

## 2019-06-25 DIAGNOSIS — Z12.11 COLON CANCER SCREENING: ICD-10-CM

## 2019-07-17 RX ORDER — METFORMIN HYDROCHLORIDE 750 MG/1
750 TABLET, EXTENDED RELEASE ORAL 2 TIMES DAILY WITH MEALS
Qty: 180 TABLET | Refills: 0 | Status: SHIPPED | OUTPATIENT
Start: 2019-07-17 | End: 2020-03-05 | Stop reason: SDUPTHER

## 2019-07-17 NOTE — PROGRESS NOTES
Refill Authorization Note     is requesting a refill authorization.    Brief assessment and rationale for refill: APPROVE:prr  Name and strength of medication: metFORMIN (GLUCOPHAGE-XR) 750 MG 24 hr tablet       Medication Therapy Plan: Diabetes - following a diabetic diet; tolerating metformin XR 750mg BID, A1C >7, Labs- wnl; NTBS(a1c); Approve 3 more months     Medication reconciliation completed: No              How patient will take medication: t1t po bid with meals          Comments:   Last A1C: (6 months)  Lab Results   Component Value Date    HGBA1C 8.0 (H) 01/10/2019    HGBA1C 7.1 (H) 09/13/2018    HGBA1C 8.8 (H) 05/03/2018    No results found for: LABA1C     Last Creatinine: (12 months)  Lab Results   Component Value Date    CREATININE 0.9 01/10/2019    CREATININE 0.8 09/13/2018    CREATININE 1.0 05/03/2018    Recommended value: eGFR  >30mL/min   Lab Results   Component Value Date    EGFRNONAA >60.0 01/10/2019      Lab Results   Component Value Date    ESTGFRAFRICA >60.0 01/10/2019         Vitamin B: (on record, any time period)  No results found for: [id number]     Digital Medicine Data: (values will display if enrolled)  Last 6 Patient Entered Readings                                          Most Recent A1c:      There is no flowsheet data to display.        - No concurrent serious illness or elevated hypoglycemic risk             APPOINTMENTS (past 12m or future 3m authorizing provider)  LAST VISIT DATE  Suleman Jernigan MD 11/12/2018         NEXT VISIT DATE  Suleman Jernigan MD Visit date not found

## 2019-10-28 ENCOUNTER — TELEPHONE (OUTPATIENT)
Dept: ADMINISTRATIVE | Facility: HOSPITAL | Age: 59
End: 2019-10-28

## 2019-10-28 ENCOUNTER — PATIENT OUTREACH (OUTPATIENT)
Dept: ADMINISTRATIVE | Facility: HOSPITAL | Age: 59
End: 2019-10-28

## 2019-10-29 RX ORDER — ROSUVASTATIN CALCIUM 20 MG/1
20 TABLET, COATED ORAL DAILY
Qty: 30 TABLET | Refills: 11 | Status: SHIPPED | OUTPATIENT
Start: 2019-10-29 | End: 2020-03-05 | Stop reason: SDUPTHER

## 2019-11-05 ENCOUNTER — TELEPHONE (OUTPATIENT)
Dept: ADMINISTRATIVE | Facility: HOSPITAL | Age: 59
End: 2019-11-05

## 2019-11-21 ENCOUNTER — TELEPHONE (OUTPATIENT)
Dept: FAMILY MEDICINE | Facility: CLINIC | Age: 59
End: 2019-11-21

## 2019-11-21 NOTE — TELEPHONE ENCOUNTER
Please call patient and get him to come and do the hemoglobin A1c lab that is in the computer.  And also make a follow with Dr. Jernigan as he will need to be seen for medication follow-up and diabetes at this time.

## 2019-11-23 ENCOUNTER — TELEPHONE (OUTPATIENT)
Dept: FAMILY MEDICINE | Facility: CLINIC | Age: 59
End: 2019-11-23

## 2020-01-02 ENCOUNTER — PATIENT OUTREACH (OUTPATIENT)
Dept: ADMINISTRATIVE | Facility: HOSPITAL | Age: 60
End: 2020-01-02

## 2020-01-17 ENCOUNTER — TELEPHONE (OUTPATIENT)
Dept: ADMINISTRATIVE | Facility: HOSPITAL | Age: 60
End: 2020-01-17

## 2020-03-02 ENCOUNTER — PATIENT OUTREACH (OUTPATIENT)
Dept: ADMINISTRATIVE | Facility: HOSPITAL | Age: 60
End: 2020-03-02

## 2020-03-02 DIAGNOSIS — E11.9 DIABETES MELLITUS WITHOUT COMPLICATION: Primary | ICD-10-CM

## 2020-03-02 NOTE — PROGRESS NOTES
Hemoglobin A1C uncontrolled report     Chart review completed 03/02/2020.  Care Everywhere updates requested and reviewed.  Immunizations reconciled. Media reviewed.       WOG orders placed

## 2020-03-03 ENCOUNTER — PATIENT OUTREACH (OUTPATIENT)
Dept: ADMINISTRATIVE | Facility: HOSPITAL | Age: 60
End: 2020-03-03

## 2020-03-03 NOTE — PROGRESS NOTES
Scheduled patient for some overdue labs to be done on 3-4-2020, also there's a FitKit at Registration for the patient.

## 2020-03-04 ENCOUNTER — LAB VISIT (OUTPATIENT)
Dept: LAB | Facility: HOSPITAL | Age: 60
End: 2020-03-04
Attending: FAMILY MEDICINE
Payer: COMMERCIAL

## 2020-03-04 DIAGNOSIS — E11.9 DIABETES MELLITUS WITHOUT COMPLICATION: ICD-10-CM

## 2020-03-04 LAB
ALBUMIN SERPL BCP-MCNC: 3.7 G/DL (ref 3.5–5.2)
ALP SERPL-CCNC: 85 U/L (ref 55–135)
ALT SERPL W/O P-5'-P-CCNC: 18 U/L (ref 10–44)
ANION GAP SERPL CALC-SCNC: 10 MMOL/L (ref 8–16)
AST SERPL-CCNC: 24 U/L (ref 10–40)
BILIRUB SERPL-MCNC: 0.7 MG/DL (ref 0.1–1)
BUN SERPL-MCNC: 8 MG/DL (ref 6–20)
CALCIUM SERPL-MCNC: 9.3 MG/DL (ref 8.7–10.5)
CHLORIDE SERPL-SCNC: 101 MMOL/L (ref 95–110)
CHOLEST SERPL-MCNC: 155 MG/DL (ref 120–199)
CHOLEST/HDLC SERPL: 3.2 {RATIO} (ref 2–5)
CO2 SERPL-SCNC: 26 MMOL/L (ref 23–29)
CREAT SERPL-MCNC: 0.9 MG/DL (ref 0.5–1.4)
EST. GFR  (AFRICAN AMERICAN): >60 ML/MIN/1.73 M^2
EST. GFR  (NON AFRICAN AMERICAN): >60 ML/MIN/1.73 M^2
ESTIMATED AVG GLUCOSE: 166 MG/DL (ref 68–131)
GLUCOSE SERPL-MCNC: 128 MG/DL (ref 70–110)
HBA1C MFR BLD HPLC: 7.4 % (ref 4–5.6)
HDLC SERPL-MCNC: 48 MG/DL (ref 40–75)
HDLC SERPL: 31 % (ref 20–50)
LDLC SERPL CALC-MCNC: 85.6 MG/DL (ref 63–159)
NONHDLC SERPL-MCNC: 107 MG/DL
POTASSIUM SERPL-SCNC: 4.4 MMOL/L (ref 3.5–5.1)
PROT SERPL-MCNC: 6.7 G/DL (ref 6–8.4)
SODIUM SERPL-SCNC: 137 MMOL/L (ref 136–145)
TRIGL SERPL-MCNC: 107 MG/DL (ref 30–150)

## 2020-03-04 PROCEDURE — 83036 HEMOGLOBIN GLYCOSYLATED A1C: CPT

## 2020-03-04 PROCEDURE — 36415 COLL VENOUS BLD VENIPUNCTURE: CPT | Mod: PO

## 2020-03-04 PROCEDURE — 80061 LIPID PANEL: CPT

## 2020-03-04 PROCEDURE — 80053 COMPREHEN METABOLIC PANEL: CPT

## 2020-03-05 ENCOUNTER — TELEPHONE (OUTPATIENT)
Dept: FAMILY MEDICINE | Facility: CLINIC | Age: 60
End: 2020-03-05

## 2020-03-05 ENCOUNTER — PATIENT OUTREACH (OUTPATIENT)
Dept: ADMINISTRATIVE | Facility: HOSPITAL | Age: 60
End: 2020-03-05

## 2020-03-05 ENCOUNTER — OFFICE VISIT (OUTPATIENT)
Dept: FAMILY MEDICINE | Facility: CLINIC | Age: 60
End: 2020-03-05
Payer: COMMERCIAL

## 2020-03-05 VITALS
SYSTOLIC BLOOD PRESSURE: 168 MMHG | WEIGHT: 173.94 LBS | OXYGEN SATURATION: 97 % | DIASTOLIC BLOOD PRESSURE: 80 MMHG | HEIGHT: 71 IN | TEMPERATURE: 98 F | BODY MASS INDEX: 24.35 KG/M2 | HEART RATE: 68 BPM

## 2020-03-05 DIAGNOSIS — Z12.5 PROSTATE CANCER SCREENING: ICD-10-CM

## 2020-03-05 DIAGNOSIS — F41.9 ANXIETY: ICD-10-CM

## 2020-03-05 DIAGNOSIS — I10 ESSENTIAL HYPERTENSION: ICD-10-CM

## 2020-03-05 DIAGNOSIS — F32.89 OTHER DEPRESSION: ICD-10-CM

## 2020-03-05 DIAGNOSIS — E11.65 UNCONTROLLED TYPE 2 DIABETES MELLITUS WITH HYPERGLYCEMIA: Primary | ICD-10-CM

## 2020-03-05 DIAGNOSIS — E78.5 HYPERLIPIDEMIA, UNSPECIFIED HYPERLIPIDEMIA TYPE: ICD-10-CM

## 2020-03-05 PROBLEM — E11.21 DIABETIC NEPHROPATHY ASSOCIATED WITH TYPE 2 DIABETES MELLITUS: Status: RESOLVED | Noted: 2018-05-23 | Resolved: 2020-03-05

## 2020-03-05 PROCEDURE — 3077F PR MOST RECENT SYSTOLIC BLOOD PRESSURE >= 140 MM HG: ICD-10-PCS | Mod: CPTII,S$GLB,, | Performed by: FAMILY MEDICINE

## 2020-03-05 PROCEDURE — 3077F SYST BP >= 140 MM HG: CPT | Mod: CPTII,S$GLB,, | Performed by: FAMILY MEDICINE

## 2020-03-05 PROCEDURE — 3008F PR BODY MASS INDEX (BMI) DOCUMENTED: ICD-10-PCS | Mod: CPTII,S$GLB,, | Performed by: FAMILY MEDICINE

## 2020-03-05 PROCEDURE — 90471 FLU VACCINE (QUAD) GREATER THAN OR EQUAL TO 3YO PRESERVATIVE FREE IM: ICD-10-PCS | Mod: S$GLB,,, | Performed by: FAMILY MEDICINE

## 2020-03-05 PROCEDURE — 3051F HG A1C>EQUAL 7.0%<8.0%: CPT | Mod: CPTII,S$GLB,, | Performed by: FAMILY MEDICINE

## 2020-03-05 PROCEDURE — 3051F PR MOST RECENT HEMOGLOBIN A1C LEVEL 7.0 - < 8.0%: ICD-10-PCS | Mod: CPTII,S$GLB,, | Performed by: FAMILY MEDICINE

## 2020-03-05 PROCEDURE — 3079F PR MOST RECENT DIASTOLIC BLOOD PRESSURE 80-89 MM HG: ICD-10-PCS | Mod: CPTII,S$GLB,, | Performed by: FAMILY MEDICINE

## 2020-03-05 PROCEDURE — 99999 PR PBB SHADOW E&M-EST. PATIENT-LVL III: CPT | Mod: PBBFAC,,, | Performed by: FAMILY MEDICINE

## 2020-03-05 PROCEDURE — 99999 PR PBB SHADOW E&M-EST. PATIENT-LVL III: ICD-10-PCS | Mod: PBBFAC,,, | Performed by: FAMILY MEDICINE

## 2020-03-05 PROCEDURE — 3079F DIAST BP 80-89 MM HG: CPT | Mod: CPTII,S$GLB,, | Performed by: FAMILY MEDICINE

## 2020-03-05 PROCEDURE — 99214 OFFICE O/P EST MOD 30 MIN: CPT | Mod: 25,S$GLB,, | Performed by: FAMILY MEDICINE

## 2020-03-05 PROCEDURE — 90471 IMMUNIZATION ADMIN: CPT | Mod: S$GLB,,, | Performed by: FAMILY MEDICINE

## 2020-03-05 PROCEDURE — 3008F BODY MASS INDEX DOCD: CPT | Mod: CPTII,S$GLB,, | Performed by: FAMILY MEDICINE

## 2020-03-05 PROCEDURE — 90686 IIV4 VACC NO PRSV 0.5 ML IM: CPT | Mod: S$GLB,,, | Performed by: FAMILY MEDICINE

## 2020-03-05 PROCEDURE — 90686 FLU VACCINE (QUAD) GREATER THAN OR EQUAL TO 3YO PRESERVATIVE FREE IM: ICD-10-PCS | Mod: S$GLB,,, | Performed by: FAMILY MEDICINE

## 2020-03-05 PROCEDURE — 99214 PR OFFICE/OUTPT VISIT, EST, LEVL IV, 30-39 MIN: ICD-10-PCS | Mod: 25,S$GLB,, | Performed by: FAMILY MEDICINE

## 2020-03-05 RX ORDER — ROSUVASTATIN CALCIUM 20 MG/1
20 TABLET, COATED ORAL DAILY
Qty: 30 TABLET | Refills: 11 | Status: SHIPPED | OUTPATIENT
Start: 2020-03-05 | End: 2021-03-23

## 2020-03-05 RX ORDER — LISINOPRIL AND HYDROCHLOROTHIAZIDE 20; 25 MG/1; MG/1
1 TABLET ORAL DAILY
Qty: 30 TABLET | Refills: 11 | Status: SHIPPED | OUTPATIENT
Start: 2020-03-05 | End: 2021-03-24

## 2020-03-05 RX ORDER — METFORMIN HYDROCHLORIDE 750 MG/1
750 TABLET, EXTENDED RELEASE ORAL 2 TIMES DAILY WITH MEALS
Qty: 60 TABLET | Refills: 11 | Status: SHIPPED | OUTPATIENT
Start: 2020-03-05 | End: 2021-03-23

## 2020-03-05 NOTE — PROGRESS NOTES
Subjective:       Patient ID: Austin Smith is a 59 y.o. male.    Chief Complaint: Follow-up (review labs--refills)    Here to f/u on chronic issues.    He is overall feeling well.  He is retired.    Cervical DDD, spinal stenosis - stable  Diabetes - following a diabetic diet; was tolerating metformin XR 750mg BID; but stopped this 4 months ago  HLD - was tolerating Crestor 20mg daily but stopped this 4 months ago  HTN - was tolerating lisinopril HCT 20/25        Follow-up   Associated symptoms include fatigue. Pertinent negatives include no abdominal pain, arthralgias, chest pain, chills, coughing, fever, headaches, nausea, neck pain, rash, sore throat or weakness.       Past Medical History:   Diagnosis Date    Anticoagulant long-term use     ASA 81mg    Arthritis     Cataract     OU    DM type 2 (diabetes mellitus, type 2)     GERD (gastroesophageal reflux disease)     Hyperlipidemia     Hypertension     Iris nevus     OS    Lattice degeneration     Migraine syndrome     Neck pain     Wears glasses        Past Surgical History:   Procedure Laterality Date    Epidural Steroid injection      Pain Management    HERNIA REPAIR Left     inguinal as child    ROTATOR CUFF REPAIR      left       Review of patient's allergies indicates:   Allergen Reactions    No known allergies        Social History     Socioeconomic History    Marital status:      Spouse name: Not on file    Number of children: 2    Years of education: Not on file    Highest education level: Not on file   Occupational History    Occupation: retired   Social Needs    Financial resource strain: Not on file    Food insecurity:     Worry: Not on file     Inability: Not on file    Transportation needs:     Medical: Not on file     Non-medical: Not on file   Tobacco Use    Smoking status: Current Every Day Smoker     Packs/day: 1.00     Types: Cigarettes     Start date: 8/19/2002    Smokeless tobacco: Never Used    Substance and Sexual Activity    Alcohol use: Yes     Frequency: 2-3 times a week     Comment: few beers per week    Drug use: No    Sexual activity: Not on file   Lifestyle    Physical activity:     Days per week: Not on file     Minutes per session: Not on file    Stress: Not on file   Relationships    Social connections:     Talks on phone: Not on file     Gets together: Not on file     Attends Mandaen service: Not on file     Active member of club or organization: Not on file     Attends meetings of clubs or organizations: Not on file     Relationship status: Not on file   Other Topics Concern    Not on file   Social History Narrative    Not on file       Current Outpatient Medications on File Prior to Visit   Medication Sig Dispense Refill    [DISCONTINUED] lisinopril-hydrochlorothiazide (PRINZIDE,ZESTORETIC) 20-25 mg Tab Take 1 tablet by mouth once daily. 30 tablet 11    [DISCONTINUED] metFORMIN (GLUCOPHAGE-XR) 750 MG 24 hr tablet Take 1 tablet (750 mg total) by mouth 2 (two) times daily with meals. 180 tablet 0    [DISCONTINUED] rosuvastatin (CRESTOR) 20 MG tablet Take 1 tablet (20 mg total) by mouth once daily. 30 tablet 11    [DISCONTINUED] buPROPion (WELLBUTRIN XL) 150 MG TB24 tablet Take 1 tablet (150 mg total) by mouth 2 (two) times daily. 60 tablet 11     No current facility-administered medications on file prior to visit.        No family history on file.    Review of Systems   Constitutional: Positive for fatigue. Negative for appetite change, chills, fever and unexpected weight change.   HENT: Negative for sore throat and trouble swallowing.    Eyes: Negative for pain and visual disturbance.   Respiratory: Negative for cough, chest tightness, shortness of breath and wheezing.    Cardiovascular: Negative for chest pain, palpitations and leg swelling.   Gastrointestinal: Negative for abdominal pain, blood in stool, constipation, diarrhea and nausea.   Genitourinary: Negative for  "difficulty urinating, dysuria and hematuria.   Musculoskeletal: Negative for arthralgias, gait problem and neck pain.   Skin: Negative for rash and wound.   Neurological: Positive for dizziness. Negative for weakness, light-headedness and headaches.   Hematological: Negative for adenopathy.   Psychiatric/Behavioral: Negative for dysphoric mood.       Objective:      BP (!) 168/80 (BP Location: Right arm, Patient Position: Sitting)   Pulse 68   Temp 98.2 °F (36.8 °C) (Oral)   Ht 5' 11" (1.803 m)   Wt 78.9 kg (173 lb 15.1 oz)   SpO2 97%   BMI 24.26 kg/m²   Physical Exam   Constitutional: He is oriented to person, place, and time. He appears well-developed and well-nourished. He is active. No distress.   HENT:   Head: Normocephalic and atraumatic.   Right Ear: External ear normal.   Left Ear: External ear normal.   Mouth/Throat: Uvula is midline, oropharynx is clear and moist and mucous membranes are normal. No oropharyngeal exudate.   Eyes: Pupils are equal, round, and reactive to light. Conjunctivae, EOM and lids are normal.   Neck: Normal range of motion, full passive range of motion without pain and phonation normal. Neck supple. No thyroid mass and no thyromegaly present.   Cardiovascular: Normal rate, regular rhythm, normal heart sounds and intact distal pulses. Exam reveals no gallop and no friction rub.   No murmur heard.  Pulmonary/Chest: Effort normal and breath sounds normal. No respiratory distress. He has no wheezes. He has no rales.   Abdominal: Soft. Bowel sounds are normal.   Musculoskeletal: Normal range of motion.   Lymphadenopathy:     He has no cervical adenopathy.   Neurological: He is alert and oriented to person, place, and time. He has normal strength. No cranial nerve deficit or sensory deficit. Coordination normal.   Skin: Skin is warm and dry.   Psychiatric: He has a normal mood and affect. His speech is normal and behavior is normal. Judgment and thought content normal.     Foot exam: " inspection normal, sensation intact; 2+ DP pulses bilaterally    Results for orders placed or performed in visit on 03/04/20   Hemoglobin A1c   Result Value Ref Range    Hemoglobin A1C 7.4 (H) 4.0 - 5.6 %    Estimated Avg Glucose 166 (H) 68 - 131 mg/dL   Comprehensive metabolic panel   Result Value Ref Range    Sodium 137 136 - 145 mmol/L    Potassium 4.4 3.5 - 5.1 mmol/L    Chloride 101 95 - 110 mmol/L    CO2 26 23 - 29 mmol/L    Glucose 128 (H) 70 - 110 mg/dL    BUN, Bld 8 6 - 20 mg/dL    Creatinine 0.9 0.5 - 1.4 mg/dL    Calcium 9.3 8.7 - 10.5 mg/dL    Total Protein 6.7 6.0 - 8.4 g/dL    Albumin 3.7 3.5 - 5.2 g/dL    Total Bilirubin 0.7 0.1 - 1.0 mg/dL    Alkaline Phosphatase 85 55 - 135 U/L    AST 24 10 - 40 U/L    ALT 18 10 - 44 U/L    Anion Gap 10 8 - 16 mmol/L    eGFR if African American >60.0 >60 mL/min/1.73 m^2    eGFR if non African American >60.0 >60 mL/min/1.73 m^2   Lipid panel   Result Value Ref Range    Cholesterol 155 120 - 199 mg/dL    Triglycerides 107 30 - 150 mg/dL    HDL 48 40 - 75 mg/dL    LDL Cholesterol 85.6 63.0 - 159.0 mg/dL    Hdl/Cholesterol Ratio 31.0 20.0 - 50.0 %    Total Cholesterol/HDL Ratio 3.2 2.0 - 5.0    Non-HDL Cholesterol 107 mg/dL       Assessment:       1. Uncontrolled type 2 diabetes mellitus with hyperglycemia    2. Essential hypertension    3. Anxiety    4. Other depression    5. Hyperlipidemia, unspecified hyperlipidemia type    6. Prostate cancer screening        Plan:       Uncontrolled type 2 diabetes mellitus with hyperglycemia  -     metFORMIN (GLUCOPHAGE-XR) 750 MG XR 24hr tablet; Take 1 tablet (750 mg total) by mouth 2 (two) times daily with meals.  Dispense: 60 tablet; Refill: 11  -     Comprehensive metabolic panel; Future; Expected date: 06/03/2020  -     Hemoglobin A1c; Future; Expected date: 06/03/2020  -     Lipid panel; Future; Expected date: 06/03/2020  -     Microalbumin/creatinine urine ratio; Future; Expected date: 06/03/2020    Essential hypertension  -      lisinopril-hydrochlorothiazide (PRINZIDE,ZESTORETIC) 20-25 mg Tab; Take 1 tablet by mouth once daily.  Dispense: 30 tablet; Refill: 11    Anxiety    Other depression    Hyperlipidemia, unspecified hyperlipidemia type  -     rosuvastatin (CRESTOR) 20 MG tablet; Take 1 tablet (20 mg total) by mouth once daily.  Dispense: 30 tablet; Refill: 11    Prostate cancer screening  -     PSA, Screening; Future; Expected date: 06/03/2020    Other orders  -     Influenza - Quadrivalent (PF)        Reassurance  Resume  meds  Flu shot  considering quitting smoking and is trying to cut back; discussed smoking cessation clinic  Counseled on regular exercise, maintenance of a healthy weight, balanced diet rich in fruits/vegetables and lean protein, and avoidance of unhealthy habits like smoking and excessive alcohol intake.  F/u 3 months with labs

## 2020-03-25 ENCOUNTER — PATIENT OUTREACH (OUTPATIENT)
Dept: ADMINISTRATIVE | Facility: OTHER | Age: 60
End: 2020-03-25

## 2020-03-25 DIAGNOSIS — E11.65 UNCONTROLLED TYPE 2 DIABETES MELLITUS WITH HYPERGLYCEMIA: Primary | ICD-10-CM

## 2020-04-13 ENCOUNTER — TELEPHONE (OUTPATIENT)
Dept: FAMILY MEDICINE | Facility: CLINIC | Age: 60
End: 2020-04-13

## 2020-04-13 NOTE — TELEPHONE ENCOUNTER
----- Message from Danya Crowell sent at 4/13/2020  8:31 AM CDT -----  Contact: Pt  Type: Needs Medical Advice  Who Called:  Pt  Symptoms (please be specific):   Migraines   How long has patient had these symptoms:   everyday for the last week  Pharmacy name and phone #:    [facility] - [place] - [address]  [address]  [place] [address]  Phone: [phone] Fax: [phone]    Best Call Back Number:  [phone]        Please advise. Thank you

## 2020-04-13 NOTE — TELEPHONE ENCOUNTER
"Called pt to ask about his migraines.  Reports known h/o migraine.  Serious migraines had disappeared for years.   Had been having them only 1 days nicole month or two and using Tylenol OTC prn.  Now getting "squiggly line and pain".  This headache has been present for 5 days.  Has been having this pain in the whole head, sensitive to colors and light.  Pain and symptoms are typical of the migraines he has had for all these years.  Nausea, no vomiting.  He has been using Tylenol for the headaches but it's not helping this time.  Thinks he may have taken Imitrex in the past but is uncertain of the name of the medication. He prefers not to come into the clinic, if possible.  BMcCoy  "

## 2020-04-13 NOTE — TELEPHONE ENCOUNTER
----- Message from Danya Crowell sent at 4/13/2020  8:31 AM CDT -----  Contact: Pt  Type: Needs Medical Advice  Who Called:  Pt  Symptoms (please be specific):   Migraines   How long has patient had these symptoms:   everyday for the last week  Pharmacy name and phone #:    [facility] - [place] - [address]  [address]  [place] [address]  Phone: [phone] Fax: [phone]    Best Call Back Number:  [phone]        Please advise. Thank you

## 2020-04-14 NOTE — TELEPHONE ENCOUNTER
----- Message from Kalli Fernandez sent at 4/14/2020  9:01 AM CDT -----    Type:  RX Refill Request    Who Called:  pt   New Rx:   RX Name and Strength:  Imitrex   For  Headaches  Pt  Was  On this  Med   Several  Years ago   requesting  Again   Preferred Pharmacy with phone number:    [facility] - [place] - [address] [facility]  [address] [facility] [place] [place] [address]  Phone: [phone] Fax: [phone]  Best Call Back Number: [phone]  Additional Information:  Pt  Is asking  For a  Call  Back  When  filling

## 2020-04-15 ENCOUNTER — TELEPHONE (OUTPATIENT)
Dept: FAMILY MEDICINE | Facility: CLINIC | Age: 60
End: 2020-04-15

## 2020-04-15 ENCOUNTER — OFFICE VISIT (OUTPATIENT)
Dept: FAMILY MEDICINE | Facility: CLINIC | Age: 60
End: 2020-04-15
Payer: COMMERCIAL

## 2020-04-15 DIAGNOSIS — G43.909 MIGRAINE SYNDROME: Primary | ICD-10-CM

## 2020-04-15 DIAGNOSIS — E78.2 MIXED HYPERLIPIDEMIA: ICD-10-CM

## 2020-04-15 DIAGNOSIS — I10 ESSENTIAL HYPERTENSION: ICD-10-CM

## 2020-04-15 DIAGNOSIS — E11.65 UNCONTROLLED TYPE 2 DIABETES MELLITUS WITH HYPERGLYCEMIA: ICD-10-CM

## 2020-04-15 PROCEDURE — 99214 OFFICE O/P EST MOD 30 MIN: CPT | Mod: 95,,, | Performed by: PHYSICIAN ASSISTANT

## 2020-04-15 PROCEDURE — 99214 PR OFFICE/OUTPT VISIT, EST, LEVL IV, 30-39 MIN: ICD-10-PCS | Mod: 95,,, | Performed by: PHYSICIAN ASSISTANT

## 2020-04-15 RX ORDER — SUMATRIPTAN SUCCINATE 100 MG/1
100 TABLET ORAL
Qty: 18 TABLET | Refills: 2 | Status: SHIPPED | OUTPATIENT
Start: 2020-04-15

## 2020-04-15 NOTE — PATIENT INSTRUCTIONS

## 2020-04-15 NOTE — PROGRESS NOTES
Subjective:       Patient ID: Austin Smith is a 60 y.o. male    Chief Complaint: No chief complaint on file.    HPI  The patient location is: [facility]  The chief complaint leading to consultation is:  Chronic migraines  Visit type: audio only  Total time spent with patient: 15 min  Each patient to whom he or she provides medical services by telemedicine is:  (1) informed of the relationship between the physician and patient and the respective role of any other health care provider with respect to management of the patient; and (2) notified that he or she may decline to receive medical services by telemedicine and may withdraw from such care at any time.    The patient is new to me PCP is Dr Jernigan.  He presents today via phone visit [place] migraines.  He gets an aura prior to his headache.  He described the Aura as a grey squiggly line.  He had 3-4 last week but prior to that he get a headache about once every week. The Headaches are triggered with certain position and occasionally bright colors will trigger a migraine.  He has seen Neurology in [place] in the past and was diagnosed with migraine at that time.  He recalls taking Imitrex in the past and thinks that it helped.    Review of Systems   Constitutional: Negative for fatigue.   HENT: Negative for sinus pain.    Eyes: Negative for visual disturbance.   Respiratory: Negative for cough and shortness of breath.    Cardiovascular: Negative for chest pain.   Gastrointestinal: Negative for diarrhea, nausea and vomiting.   Musculoskeletal: Negative for back pain.   Skin: Negative for rash.   Neurological: Positive for headaches. Negative for dizziness.   Psychiatric/Behavioral: Negative for decreased concentration. The patient is not nervous/anxious.         Objective:   Physical Exam   The patient's voice is clear and he answers all questions appropriately.  Assessment:       1. Migraine syndrome  sumatriptan (IMITREX) 100 MG tablet    Ambulatory  referral/consult to Neurology   2. Uncontrolled type 2 diabetes mellitus with hyperglycemia     3. Essential hypertension     4. Mixed hyperlipidemia          Plan:       Uncontrolled type 2 diabetes mellitus with hyperglycemia  - continue current    Migraine syndrome  -    START sumatriptan (IMITREX) 100 MG tablet; Take 1 tablet (100 mg total) by mouth every 2 (two) hours as needed for Migraine. No more than twice in a 24 hour period  Dispense: 18 tablet; Refill: 2  -     Ambulatory referral/consult to Neurology    Essential hypertension  - continue current    Mixed hyperlipidemia  - continue current

## 2020-04-15 NOTE — TELEPHONE ENCOUNTER
----- Message from Kadeem Arreguin sent at 4/15/2020  9:47 AM CDT -----  Type: Needs Medical Advice  Who Called:  Self   Symptoms (please be specific):   How long has patient had these symptoms:    Pharmacy name and phone #:  Best Call Back Number: [phone]   Additional Information: Patient called asking for an update for new rx for migraine.

## 2020-04-15 NOTE — TELEPHONE ENCOUNTER
I spoke with pt and and he wants to do face tome to discuss his migraines. And perhaps get a new medicine.   He is aware that Dr. Jernigan is out of clinic this week.

## 2020-04-19 RX ORDER — SUMATRIPTAN 50 MG/1
50 TABLET, FILM COATED ORAL ONCE AS NEEDED
Qty: 30 TABLET | Refills: 0 | OUTPATIENT
Start: 2020-04-19 | End: 2020-04-19

## 2020-04-19 NOTE — PROGRESS NOTES
Refill Authorization Note     is requesting a refill authorization.    Brief assessment and rationale for refill: QUICK DC: duplicate                                         Comments:   Last Prescribed Info:   Outpatient Medication Detail      Disp Refills Start End    sumatriptan (IMITREX) 100 MG tablet 18 tablet 2 4/15/2020     Sig - Route: Take 1 tablet (100 mg total) by mouth every 2 (two) hours as needed for Migraine. No more than twice in a 24 hour period - Oral    Sent to pharmacy as: sumatriptan (IMITREX) 100 MG tablet    E-Prescribing Status: Receipt confirmed by pharmacy (4/15/2020  2:18 PM CDT)    Ordering Encounter Report     Associated Reports   View Encounter

## 2020-05-05 ENCOUNTER — PATIENT MESSAGE (OUTPATIENT)
Dept: ADMINISTRATIVE | Facility: HOSPITAL | Age: 60
End: 2020-05-05

## 2020-05-27 ENCOUNTER — PATIENT OUTREACH (OUTPATIENT)
Dept: ADMINISTRATIVE | Facility: HOSPITAL | Age: 60
End: 2020-05-27

## 2020-05-27 NOTE — PROGRESS NOTES
Chart review completed 05/27/2020.  Care Everywhere updates requested and reviewed.  Immunizations reconciled. Media reviewed.

## 2020-06-11 ENCOUNTER — PATIENT OUTREACH (OUTPATIENT)
Dept: ADMINISTRATIVE | Facility: HOSPITAL | Age: 60
End: 2020-06-11

## 2020-06-11 NOTE — PROGRESS NOTES
Chart review completed 06/11/2020.  Care Everywhere updates requested and reviewed.  Immunizations reconciled. Media reviewed.     Lab peter, and quest reviewed.    Health Maintenance Due   Topic Date Due    HIV Screening  [birth year]1977    Pneumococcal Vaccine (Medium Risk) (1 of 1 - PPSV23) 03/17/1981    Shingles Vaccine (1 of 2) 03/17/2012    Colonoscopy  03/17/2012    Eye Exam  06/20/2019    Foot Exam  03/21/2020

## 2020-06-19 ENCOUNTER — TELEPHONE (OUTPATIENT)
Dept: FAMILY MEDICINE | Facility: CLINIC | Age: 60
End: 2020-06-19

## 2020-06-26 DIAGNOSIS — Z12.11 COLON CANCER SCREENING: ICD-10-CM

## 2020-10-05 ENCOUNTER — PATIENT MESSAGE (OUTPATIENT)
Dept: ADMINISTRATIVE | Facility: HOSPITAL | Age: 60
End: 2020-10-05

## 2020-10-27 ENCOUNTER — PATIENT OUTREACH (OUTPATIENT)
Dept: ADMINISTRATIVE | Facility: HOSPITAL | Age: 60
End: 2020-10-27

## 2020-10-30 ENCOUNTER — PATIENT MESSAGE (OUTPATIENT)
Dept: ADMINISTRATIVE | Facility: HOSPITAL | Age: 60
End: 2020-10-30

## 2020-11-18 ENCOUNTER — PATIENT OUTREACH (OUTPATIENT)
Dept: ADMINISTRATIVE | Facility: HOSPITAL | Age: 60
End: 2020-11-18

## 2020-11-18 NOTE — PROGRESS NOTES
HTN GAP REPORT  BP NOT DOCUMENTED DURING PREVIOUS OFFICE VISIT.    REQUESTING A REMOTE HOME BLOOD PRESSURE READING TO DOCUMENT IN DISCRETE FIELD OR PATIENT NEEDS TO SCHEDULE AN OFFICE VISIT OR NURSE VISIT FOR A BP CHECK

## 2020-11-20 ENCOUNTER — PATIENT OUTREACH (OUTPATIENT)
Dept: ADMINISTRATIVE | Facility: HOSPITAL | Age: 60
End: 2020-11-20

## 2020-12-04 ENCOUNTER — PATIENT OUTREACH (OUTPATIENT)
Dept: ADMINISTRATIVE | Facility: HOSPITAL | Age: 60
End: 2020-12-04

## 2020-12-11 ENCOUNTER — LAB VISIT (OUTPATIENT)
Dept: LAB | Facility: HOSPITAL | Age: 60
End: 2020-12-11
Attending: FAMILY MEDICINE
Payer: COMMERCIAL

## 2020-12-11 DIAGNOSIS — Z12.5 PROSTATE CANCER SCREENING: ICD-10-CM

## 2020-12-11 DIAGNOSIS — E11.65 UNCONTROLLED TYPE 2 DIABETES MELLITUS WITH HYPERGLYCEMIA: ICD-10-CM

## 2020-12-11 LAB
ALBUMIN SERPL BCP-MCNC: 4.1 G/DL (ref 3.5–5.2)
ALP SERPL-CCNC: 85 U/L (ref 55–135)
ALT SERPL W/O P-5'-P-CCNC: 48 U/L (ref 10–44)
ANION GAP SERPL CALC-SCNC: 11 MMOL/L (ref 8–16)
AST SERPL-CCNC: 34 U/L (ref 10–40)
BILIRUB SERPL-MCNC: 0.4 MG/DL (ref 0.1–1)
BUN SERPL-MCNC: 7 MG/DL (ref 6–20)
CALCIUM SERPL-MCNC: 10 MG/DL (ref 8.7–10.5)
CHLORIDE SERPL-SCNC: 98 MMOL/L (ref 95–110)
CHOLEST SERPL-MCNC: 124 MG/DL (ref 120–199)
CHOLEST/HDLC SERPL: 2 {RATIO} (ref 2–5)
CO2 SERPL-SCNC: 29 MMOL/L (ref 23–29)
CREAT SERPL-MCNC: 0.8 MG/DL (ref 0.5–1.4)
EST. GFR  (AFRICAN AMERICAN): >60 ML/MIN/1.73 M^2
EST. GFR  (NON AFRICAN AMERICAN): >60 ML/MIN/1.73 M^2
ESTIMATED AVG GLUCOSE: 108 MG/DL (ref 68–131)
GLUCOSE SERPL-MCNC: 128 MG/DL (ref 70–110)
HBA1C MFR BLD HPLC: 5.4 % (ref 4–5.6)
HDLC SERPL-MCNC: 62 MG/DL (ref 40–75)
HDLC SERPL: 50 % (ref 20–50)
LDLC SERPL CALC-MCNC: 52.6 MG/DL (ref 63–159)
NONHDLC SERPL-MCNC: 62 MG/DL
POTASSIUM SERPL-SCNC: 4.3 MMOL/L (ref 3.5–5.1)
PROT SERPL-MCNC: 7.3 G/DL (ref 6–8.4)
SODIUM SERPL-SCNC: 138 MMOL/L (ref 136–145)
TRIGL SERPL-MCNC: 47 MG/DL (ref 30–150)

## 2020-12-11 PROCEDURE — 84153 ASSAY OF PSA TOTAL: CPT

## 2020-12-11 PROCEDURE — 80053 COMPREHEN METABOLIC PANEL: CPT

## 2020-12-11 PROCEDURE — 80061 LIPID PANEL: CPT

## 2020-12-11 PROCEDURE — 83036 HEMOGLOBIN GLYCOSYLATED A1C: CPT

## 2020-12-11 PROCEDURE — 36415 COLL VENOUS BLD VENIPUNCTURE: CPT | Mod: PO

## 2020-12-12 LAB — COMPLEXED PSA SERPL-MCNC: 1.2 NG/ML (ref 0–4)

## 2020-12-30 ENCOUNTER — TELEPHONE (OUTPATIENT)
Dept: FAMILY MEDICINE | Facility: CLINIC | Age: 60
End: 2020-12-30

## 2020-12-30 NOTE — TELEPHONE ENCOUNTER
----- Message from Tree Pack sent at 12/30/2020 11:46 AM CST -----  Regarding: pt  Type:  Test Results    Who Called:  pt  Name of Test (Lab/Mammo/Etc):  FASTING LAB [2136]  Date of Test:  12/11/2020  Ordering Provider:  Dr Jernigan  Where the test was performed:  [facility]  Best Call Back Number:  [phone]   Additional Information:  pt does not have access to [facility]. Please call to discuss lab results. Thanks.

## 2021-01-04 ENCOUNTER — PATIENT MESSAGE (OUTPATIENT)
Dept: ADMINISTRATIVE | Facility: HOSPITAL | Age: 61
End: 2021-01-04

## 2021-03-20 DIAGNOSIS — I10 ESSENTIAL HYPERTENSION: ICD-10-CM

## 2021-03-20 DIAGNOSIS — E78.5 HYPERLIPIDEMIA, UNSPECIFIED HYPERLIPIDEMIA TYPE: ICD-10-CM

## 2021-03-20 DIAGNOSIS — E11.65 UNCONTROLLED TYPE 2 DIABETES MELLITUS WITH HYPERGLYCEMIA: ICD-10-CM

## 2021-03-23 RX ORDER — METFORMIN HYDROCHLORIDE 750 MG/1
TABLET, EXTENDED RELEASE ORAL
Qty: 180 TABLET | Refills: 0 | Status: SHIPPED | OUTPATIENT
Start: 2021-03-23 | End: 2021-04-26 | Stop reason: SDUPTHER

## 2021-03-23 RX ORDER — ROSUVASTATIN CALCIUM 20 MG/1
TABLET, COATED ORAL
Qty: 90 TABLET | Refills: 0 | Status: SHIPPED | OUTPATIENT
Start: 2021-03-23 | End: 2021-04-26 | Stop reason: SDUPTHER

## 2021-03-24 RX ORDER — LISINOPRIL AND HYDROCHLOROTHIAZIDE 20; 25 MG/1; MG/1
TABLET ORAL
Qty: 90 TABLET | Refills: 0 | Status: SHIPPED | OUTPATIENT
Start: 2021-03-24 | End: 2021-04-26 | Stop reason: SDUPTHER

## 2021-04-06 ENCOUNTER — PATIENT MESSAGE (OUTPATIENT)
Dept: ADMINISTRATIVE | Facility: HOSPITAL | Age: 61
End: 2021-04-06

## 2021-04-26 ENCOUNTER — PATIENT OUTREACH (OUTPATIENT)
Dept: ADMINISTRATIVE | Facility: HOSPITAL | Age: 61
End: 2021-04-26

## 2021-04-26 DIAGNOSIS — E78.5 HYPERLIPIDEMIA, UNSPECIFIED HYPERLIPIDEMIA TYPE: ICD-10-CM

## 2021-04-26 DIAGNOSIS — I10 ESSENTIAL HYPERTENSION: ICD-10-CM

## 2021-04-26 DIAGNOSIS — E11.65 UNCONTROLLED TYPE 2 DIABETES MELLITUS WITH HYPERGLYCEMIA: ICD-10-CM

## 2021-04-26 DIAGNOSIS — Z00.00 PREVENTATIVE HEALTH CARE: Primary | ICD-10-CM

## 2021-04-26 RX ORDER — ROSUVASTATIN CALCIUM 20 MG/1
TABLET, COATED ORAL
Qty: 90 TABLET | Refills: 1 | Status: SHIPPED | OUTPATIENT
Start: 2021-04-26 | End: 2022-04-23

## 2021-04-26 RX ORDER — METFORMIN HYDROCHLORIDE 750 MG/1
TABLET, EXTENDED RELEASE ORAL
Qty: 180 TABLET | Refills: 1 | Status: SHIPPED | OUTPATIENT
Start: 2021-04-26 | End: 2022-04-23

## 2021-04-26 RX ORDER — LISINOPRIL AND HYDROCHLOROTHIAZIDE 20; 25 MG/1; MG/1
TABLET ORAL
Qty: 90 TABLET | Refills: 1 | Status: SHIPPED | OUTPATIENT
Start: 2021-04-26 | End: 2022-04-23

## 2021-05-02 ENCOUNTER — PATIENT OUTREACH (OUTPATIENT)
Dept: ADMINISTRATIVE | Facility: OTHER | Age: 61
End: 2021-05-02

## 2021-05-02 DIAGNOSIS — E11.65 UNCONTROLLED TYPE 2 DIABETES MELLITUS WITH HYPERGLYCEMIA: Primary | ICD-10-CM

## 2021-05-03 ENCOUNTER — OFFICE VISIT (OUTPATIENT)
Dept: PODIATRY | Facility: CLINIC | Age: 61
End: 2021-05-03
Payer: COMMERCIAL

## 2021-05-03 VITALS
SYSTOLIC BLOOD PRESSURE: 134 MMHG | WEIGHT: 175 LBS | HEIGHT: 72 IN | HEART RATE: 83 BPM | TEMPERATURE: 98 F | RESPIRATION RATE: 18 BRPM | DIASTOLIC BLOOD PRESSURE: 76 MMHG | BODY MASS INDEX: 23.7 KG/M2

## 2021-05-03 DIAGNOSIS — M20.41 HAMMER TOES OF BOTH FEET: ICD-10-CM

## 2021-05-03 DIAGNOSIS — E11.9 TYPE 2 DIABETES MELLITUS WITHOUT COMPLICATION, WITHOUT LONG-TERM CURRENT USE OF INSULIN: Primary | ICD-10-CM

## 2021-05-03 DIAGNOSIS — M21.6X9 PLANTARFLEXION DEFORMITY OF FOOT: ICD-10-CM

## 2021-05-03 DIAGNOSIS — M20.42 HAMMER TOES OF BOTH FEET: ICD-10-CM

## 2021-05-03 DIAGNOSIS — Q66.70 PES CAVUS, CONGENITAL: ICD-10-CM

## 2021-05-03 DIAGNOSIS — L97.509 ULCER OF FOOT, UNSPECIFIED LATERALITY, UNSPECIFIED ULCER STAGE: ICD-10-CM

## 2021-05-03 PROCEDURE — 99999 PR PBB SHADOW E&M-EST. PATIENT-LVL IV: ICD-10-PCS | Mod: PBBFAC,,, | Performed by: PODIATRIST

## 2021-05-03 PROCEDURE — 1125F PR PAIN SEVERITY QUANTIFIED, PAIN PRESENT: ICD-10-PCS | Mod: S$GLB,,, | Performed by: PODIATRIST

## 2021-05-03 PROCEDURE — 99204 OFFICE O/P NEW MOD 45 MIN: CPT | Mod: S$GLB,,, | Performed by: PODIATRIST

## 2021-05-03 PROCEDURE — 3008F PR BODY MASS INDEX (BMI) DOCUMENTED: ICD-10-PCS | Mod: CPTII,S$GLB,, | Performed by: PODIATRIST

## 2021-05-03 PROCEDURE — 3008F BODY MASS INDEX DOCD: CPT | Mod: CPTII,S$GLB,, | Performed by: PODIATRIST

## 2021-05-03 PROCEDURE — 99999 PR PBB SHADOW E&M-EST. PATIENT-LVL IV: CPT | Mod: PBBFAC,,, | Performed by: PODIATRIST

## 2021-05-03 PROCEDURE — 99204 PR OFFICE/OUTPT VISIT, NEW, LEVL IV, 45-59 MIN: ICD-10-PCS | Mod: S$GLB,,, | Performed by: PODIATRIST

## 2021-05-03 PROCEDURE — 1125F AMNT PAIN NOTED PAIN PRSNT: CPT | Mod: S$GLB,,, | Performed by: PODIATRIST

## 2021-05-04 PROBLEM — L97.509 ULCER OF FOOT: Status: ACTIVE | Noted: 2021-05-04

## 2021-05-04 PROBLEM — M20.42 HAMMER TOES OF BOTH FEET: Status: ACTIVE | Noted: 2021-05-04

## 2021-05-04 PROBLEM — M21.6X9 PLANTARFLEXION DEFORMITY OF FOOT: Status: ACTIVE | Noted: 2021-05-04

## 2021-05-04 PROBLEM — E11.9 TYPE 2 DIABETES MELLITUS WITHOUT COMPLICATION, WITHOUT LONG-TERM CURRENT USE OF INSULIN: Status: ACTIVE | Noted: 2021-05-04

## 2021-05-04 PROBLEM — M20.41 HAMMER TOES OF BOTH FEET: Status: ACTIVE | Noted: 2021-05-04

## 2021-05-04 PROBLEM — Q66.70 PES CAVUS, CONGENITAL: Status: ACTIVE | Noted: 2021-05-04

## 2021-05-05 ENCOUNTER — LAB VISIT (OUTPATIENT)
Dept: LAB | Facility: HOSPITAL | Age: 61
End: 2021-05-05
Attending: FAMILY MEDICINE
Payer: COMMERCIAL

## 2021-05-05 DIAGNOSIS — Z00.00 PREVENTATIVE HEALTH CARE: ICD-10-CM

## 2021-05-05 LAB
ALBUMIN SERPL BCP-MCNC: 4.3 G/DL (ref 3.5–5.2)
ALP SERPL-CCNC: 79 U/L (ref 55–135)
ALT SERPL W/O P-5'-P-CCNC: 36 U/L (ref 10–44)
ANION GAP SERPL CALC-SCNC: 11 MMOL/L (ref 8–16)
AST SERPL-CCNC: 25 U/L (ref 10–40)
BILIRUB SERPL-MCNC: 0.6 MG/DL (ref 0.1–1)
BUN SERPL-MCNC: 9 MG/DL (ref 6–20)
CALCIUM SERPL-MCNC: 10.1 MG/DL (ref 8.7–10.5)
CHLORIDE SERPL-SCNC: 104 MMOL/L (ref 95–110)
CHOLEST SERPL-MCNC: 135 MG/DL (ref 120–199)
CHOLEST/HDLC SERPL: 2.5 {RATIO} (ref 2–5)
CO2 SERPL-SCNC: 27 MMOL/L (ref 23–29)
COMPLEXED PSA SERPL-MCNC: 0.91 NG/ML (ref 0–4)
CREAT SERPL-MCNC: 1 MG/DL (ref 0.5–1.4)
EST. GFR  (AFRICAN AMERICAN): >60 ML/MIN/1.73 M^2
EST. GFR  (NON AFRICAN AMERICAN): >60 ML/MIN/1.73 M^2
ESTIMATED AVG GLUCOSE: 126 MG/DL (ref 68–131)
GLUCOSE SERPL-MCNC: 120 MG/DL (ref 70–110)
HBA1C MFR BLD: 6 % (ref 4.5–6.2)
HDLC SERPL-MCNC: 54 MG/DL (ref 40–75)
HDLC SERPL: 40 % (ref 20–50)
LDLC SERPL CALC-MCNC: 63.8 MG/DL (ref 63–159)
NONHDLC SERPL-MCNC: 81 MG/DL
POTASSIUM SERPL-SCNC: 4.3 MMOL/L (ref 3.5–5.1)
PROT SERPL-MCNC: 7.6 G/DL (ref 6–8.4)
SODIUM SERPL-SCNC: 142 MMOL/L (ref 136–145)
TRIGL SERPL-MCNC: 86 MG/DL (ref 30–150)

## 2021-05-05 PROCEDURE — 80053 COMPREHEN METABOLIC PANEL: CPT | Performed by: FAMILY MEDICINE

## 2021-05-05 PROCEDURE — 83036 HEMOGLOBIN GLYCOSYLATED A1C: CPT | Performed by: FAMILY MEDICINE

## 2021-05-05 PROCEDURE — 80061 LIPID PANEL: CPT | Performed by: FAMILY MEDICINE

## 2021-05-05 PROCEDURE — 36415 COLL VENOUS BLD VENIPUNCTURE: CPT | Performed by: FAMILY MEDICINE

## 2021-05-05 PROCEDURE — 84153 ASSAY OF PSA TOTAL: CPT | Performed by: FAMILY MEDICINE

## 2021-06-01 ENCOUNTER — TELEPHONE (OUTPATIENT)
Dept: PODIATRY | Facility: CLINIC | Age: 61
End: 2021-06-01

## 2021-06-02 ENCOUNTER — OFFICE VISIT (OUTPATIENT)
Dept: PODIATRY | Facility: CLINIC | Age: 61
End: 2021-06-02
Payer: COMMERCIAL

## 2021-06-02 ENCOUNTER — PATIENT OUTREACH (OUTPATIENT)
Dept: ADMINISTRATIVE | Facility: OTHER | Age: 61
End: 2021-06-02

## 2021-06-02 ENCOUNTER — HOSPITAL ENCOUNTER (OUTPATIENT)
Dept: RADIOLOGY | Facility: HOSPITAL | Age: 61
Discharge: HOME | End: 2021-06-02
Attending: PODIATRIST
Payer: COMMERCIAL

## 2021-06-02 VITALS
HEART RATE: 94 BPM | RESPIRATION RATE: 18 BRPM | SYSTOLIC BLOOD PRESSURE: 154 MMHG | HEIGHT: 72 IN | DIASTOLIC BLOOD PRESSURE: 92 MMHG | BODY MASS INDEX: 23.7 KG/M2 | TEMPERATURE: 98 F | WEIGHT: 175 LBS

## 2021-06-02 DIAGNOSIS — M20.42 HAMMER TOES OF BOTH FEET: ICD-10-CM

## 2021-06-02 DIAGNOSIS — M21.6X9 PLANTARFLEXION DEFORMITY OF FOOT: Primary | ICD-10-CM

## 2021-06-02 DIAGNOSIS — M20.60 ACQUIRED DEFORMITY OF TOE, UNSPECIFIED LATERALITY: ICD-10-CM

## 2021-06-02 DIAGNOSIS — M20.41 HAMMER TOES OF BOTH FEET: ICD-10-CM

## 2021-06-02 DIAGNOSIS — M21.6X9 PLANTARFLEXION DEFORMITY OF FOOT: ICD-10-CM

## 2021-06-02 DIAGNOSIS — E11.9 TYPE 2 DIABETES MELLITUS WITHOUT COMPLICATION, WITHOUT LONG-TERM CURRENT USE OF INSULIN: ICD-10-CM

## 2021-06-02 PROCEDURE — 73630 X-RAY EXAM OF FOOT: CPT | Mod: 26,50,, | Performed by: RADIOLOGY

## 2021-06-02 PROCEDURE — 99999 PR PBB SHADOW E&M-EST. PATIENT-LVL IV: CPT | Mod: PBBFAC,,, | Performed by: PODIATRIST

## 2021-06-02 PROCEDURE — 1125F PR PAIN SEVERITY QUANTIFIED, PAIN PRESENT: ICD-10-PCS | Mod: S$GLB,,, | Performed by: PODIATRIST

## 2021-06-02 PROCEDURE — 99214 OFFICE O/P EST MOD 30 MIN: CPT | Mod: S$GLB,,, | Performed by: PODIATRIST

## 2021-06-02 PROCEDURE — 3008F PR BODY MASS INDEX (BMI) DOCUMENTED: ICD-10-PCS | Mod: CPTII,S$GLB,, | Performed by: PODIATRIST

## 2021-06-02 PROCEDURE — 73630 XR FOOT COMPLETE 3 VIEW BILATERAL: ICD-10-PCS | Mod: 26,50,, | Performed by: RADIOLOGY

## 2021-06-02 PROCEDURE — 1125F AMNT PAIN NOTED PAIN PRSNT: CPT | Mod: S$GLB,,, | Performed by: PODIATRIST

## 2021-06-02 PROCEDURE — 73630 X-RAY EXAM OF FOOT: CPT | Mod: TC,50,FY

## 2021-06-02 PROCEDURE — 99999 PR PBB SHADOW E&M-EST. PATIENT-LVL IV: ICD-10-PCS | Mod: PBBFAC,,, | Performed by: PODIATRIST

## 2021-06-02 PROCEDURE — 99214 PR OFFICE/OUTPT VISIT, EST, LEVL IV, 30-39 MIN: ICD-10-PCS | Mod: S$GLB,,, | Performed by: PODIATRIST

## 2021-06-02 PROCEDURE — 3008F BODY MASS INDEX DOCD: CPT | Mod: CPTII,S$GLB,, | Performed by: PODIATRIST

## 2021-06-30 DIAGNOSIS — Z12.11 COLON CANCER SCREENING: ICD-10-CM

## 2021-07-07 ENCOUNTER — PATIENT MESSAGE (OUTPATIENT)
Dept: ADMINISTRATIVE | Facility: HOSPITAL | Age: 61
End: 2021-07-07

## 2021-12-13 ENCOUNTER — PATIENT OUTREACH (OUTPATIENT)
Dept: ADMINISTRATIVE | Facility: HOSPITAL | Age: 61
End: 2021-12-13
Payer: COMMERCIAL

## 2021-12-13 ENCOUNTER — PATIENT MESSAGE (OUTPATIENT)
Dept: ADMINISTRATIVE | Facility: HOSPITAL | Age: 61
End: 2021-12-13
Payer: COMMERCIAL

## 2022-03-24 ENCOUNTER — PATIENT OUTREACH (OUTPATIENT)
Dept: ADMINISTRATIVE | Facility: HOSPITAL | Age: 62
End: 2022-03-24
Payer: COMMERCIAL

## 2022-03-24 DIAGNOSIS — E11.9 DIABETES MELLITUS WITHOUT COMPLICATION: Primary | ICD-10-CM

## 2022-03-24 NOTE — PROGRESS NOTES
Chart review completed for the following HM test:   Care Everywhere and Media reports - updates requested and reviewed.      Routine Dilated Eye Exam test for Diabetic Retinopathy       STATES WILL CALL BACK TO SCHEDULE,  IN [place]  EYE EXAM,   Colon cancer screening,  HE WILL THINK ABOUT THE FIT KIT.  AND LABS.     LAB ORDERS PLACED

## 2022-05-31 ENCOUNTER — PATIENT MESSAGE (OUTPATIENT)
Dept: ADMINISTRATIVE | Facility: HOSPITAL | Age: 62
End: 2022-05-31
Payer: COMMERCIAL

## 2023-05-03 ENCOUNTER — TELEPHONE (OUTPATIENT)
Dept: FAMILY MEDICINE | Facility: CLINIC | Age: 63
End: 2023-05-03
Payer: COMMERCIAL

## 2023-05-03 NOTE — TELEPHONE ENCOUNTER
Spoke to pt regarding scheduling an appt. Pt states that he lives in [place] an he will schedule an appt with a physician close to home.

## 2023-05-03 NOTE — TELEPHONE ENCOUNTER
----- Message from Eddie Regan sent at 5/3/2023 12:32 PM CDT -----  Type:  Sooner Appointment Request    Caller is requesting a sooner appointment.  Caller declined first available appointment listed below.  Caller will not accept being placed on the waitlist and is requesting a message be sent to doctor.    Name of Caller:  Wife/Viji Mottahing  When is the first available appointment?  Pt hasn't seen Dr. Jernigan since 2018 and would like to reestablish care  Symptoms:  Losing weight  Best Call Back Number:  [phone]  Additional Information: